# Patient Record
Sex: MALE | Race: WHITE | NOT HISPANIC OR LATINO | Employment: OTHER | URBAN - METROPOLITAN AREA
[De-identification: names, ages, dates, MRNs, and addresses within clinical notes are randomized per-mention and may not be internally consistent; named-entity substitution may affect disease eponyms.]

---

## 2016-03-20 LAB — HM AAA US VASCULAR SCREEN: NORMAL

## 2017-01-03 ENCOUNTER — MED INFO FORMS (OUTPATIENT)
Dept: ORTHOPEDICS | Age: 62
End: 2017-01-03

## 2018-02-26 ENCOUNTER — TELEPHONE (OUTPATIENT)
Dept: ORTHOPEDICS | Age: 63
End: 2018-02-26

## 2019-02-18 ENCOUNTER — HOSPITAL ENCOUNTER (EMERGENCY)
Facility: CLINIC | Age: 64
Discharge: HOME | End: 2019-02-18
Attending: EMERGENCY MEDICINE | Admitting: EMERGENCY MEDICINE
Payer: OTHER MISCELLANEOUS

## 2019-02-18 VITALS
SYSTOLIC BLOOD PRESSURE: 156 MMHG | HEART RATE: 77 BPM | TEMPERATURE: 97.9 F | BODY MASS INDEX: 31.21 KG/M2 | RESPIRATION RATE: 16 BRPM | OXYGEN SATURATION: 98 % | HEIGHT: 70 IN | DIASTOLIC BLOOD PRESSURE: 100 MMHG | WEIGHT: 218 LBS

## 2019-02-18 DIAGNOSIS — S05.8X1A SUPERFICIAL INJURY OF RIGHT CORNEA, INITIAL ENCOUNTER: ICD-10-CM

## 2019-02-18 PROCEDURE — 99282 EMERGENCY DEPT VISIT SF MDM: CPT

## 2019-02-18 RX ORDER — PROPARACAINE HYDROCHLORIDE 5 MG/ML
SOLUTION/ DROPS OPHTHALMIC
Status: DISCONTINUED
Start: 2019-02-18 | End: 2019-02-18 | Stop reason: HOSPADM

## 2019-02-18 RX ORDER — OFLOXACIN 3 MG/ML
1-2 SOLUTION/ DROPS OPHTHALMIC
Qty: 9 ML | Refills: 0 | Status: SHIPPED | OUTPATIENT
Start: 2019-02-18 | End: 2019-02-28

## 2019-02-18 ASSESSMENT — ENCOUNTER SYMPTOMS: EYE PAIN: 1

## 2019-02-18 ASSESSMENT — MIFFLIN-ST. JEOR: SCORE: 1795.09

## 2019-02-18 NOTE — ED AVS SNAPSHOT
Emergency Department  [address] [place] [id number]  Phone:  [phone]  Fax:  [phone]                                    Mil Banda   MRN: [id number]    Department:   Emergency Department   Date of Visit:  2/18/2019           After Visit Summary Signature Page    I have received my discharge instructions, and my questions have been answered. I have discussed any challenges I see with this plan with the nurse or doctor.    ..........................................................................................................................................  Patient/Patient Representative Signature      ..........................................................................................................................................  Patient Representative Print Name and Relationship to Patient    ..................................................               ................................................  Date                                   Time    ..........................................................................................................................................  Reviewed by Signature/Title    ...................................................              ..............................................  Date                                               Time          22EPIC Rev 08/18

## 2019-02-18 NOTE — ED PROVIDER NOTES
"  History     Chief Complaint:  Eye Problem     HPI   Mil Banda is a 64 year old male who presents with an eye problem. 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 Patient may have touched his hand on an unknown chemical from a barrel and rubbed his eye afterwards. Patient subsequently began feeling burning and experiencing \"fogginess\" in his right eye. This occurred about almost 3 hours ago. Patient only has some mild pain to his right eye. Patient is concerned about the fogginess in his vision, therefore prompting his visit here in the emergency department for further evaluation and treatment. The patient thinks he may have touched dampening system  [employer].     Allergies:  No known drug allergies     Medications:    The patient is currently on no regular medications.     Past Medical History:    The patient does not have any past pertinent medical history.     Past Surgical History:    History reviewed. No pertinent surgical history.     Family History:    History reviewed. No pertinent family history.      Social History:  Smoking status: Never smoker  Alcohol use: Yes   PCP: No primary care provider on file.   Presents to the ED with a friend.     Review of Systems   Eyes: Positive for pain and visual disturbance.   All other systems reviewed and are negative.        Physical Exam   Patient Vitals for the past 24 hrs:   BP Temp Temp src Pulse Resp SpO2 Height Weight   02/18/19 0347 139/86 97.9  F (36.6  C) Oral 79 20 98 % 1.778 m (5' 10\") 98.9 kg (218 lb)     Visual Acuity-Right: 20/50 Visual Acuity-Left: 20/40     Physical Exam  Vitals: reviewed by me  General: Pt seen on [facility], pleasant, cooperative, and alert to conversation  Eyes: Tracking well, clear conjunctiva BL  Right eye with 15 units of pressure on the tonopen  No clear uptake on fluorescein  PH 7.0 on initial testing  Extraocular movements intact, pupils round and reactive to light and " accommodation.  Lids and lashes unremarkable.  No discharge.  ENT: MMM, midline trachea.   Lungs:  o tachypnea, no accessory muscle use. No respiratory distress.   CV: Rate as above, regular rhythm.    MSK: no peripheral edema or joint effusion.  No evidence of trauma  Skin: No rash, normal turgor and temperature  Neuro: Clear speech and no facial droop.  Psych: Not RIS, no e/o AH/VH    Emergency Department Course   Emergency Department Course:  Past medical records, nursing notes, and vitals reviewed.  0354: I performed an exam of the patient and obtained history, as documented above.   0412: I spoke with poison [facility].  0417: I rechecked the patient and updated the patient regarding the findings.   0451: I rechecked the patient. Patient discharged home with instructions regarding supportive care, medications, and reasons to return. The importance of close follow-up was reviewed.      Impression & Plan    Medical Decision Making:  Mil Banda is a 64 year old male who presents to the emergency room with eye blurriness after rubbing a chemical in his eye on accident. He has normal pressure here, has no corneal abrasions, has a normal visual acuity for the most part apart from slight blurriness. No visual fields cut, does not sound like a stroke, no evidence of glaucoma, patient otherwise doing well. I did speak with the poison center, their formal recommendations are eye irrigation and discharge when patient's feeling improved. Will discharge with very clear return to the ED precautions, primary care follow-up and ophthalmology follow-up.     5:12 AM  Patient does endorse mild blurriness of his vision is still present, even after irrigation.  I rechecked and he still has no evidence of a formal corneal ulcer, but may have irritated his cornea with a caustic substance.  As such we will give antibiotics to prevent infection, and phone number for same day or at least appointment within 48 hours with ophthalmology.      Diagnosis:    ICD-10-CM    1. Superficial injury of right cornea, initial encounter S05.8X1A        Disposition:  discharged to home    Discharge Medications:     Medication List      There are no discharge medications for this visit.         Fernanda Rodriguez  2/18/2019    EMERGENCY DEPARTMENT  Fernanda GRAY Do, am serving as a scribe at 3:54 AM on 2/18/2019 to document services personally performed by Surinder Rao MD based on my observations and the provider's statements to me.       Surinder Rao MD  02/18/19 0513

## 2020-12-09 LAB — COLONOSCOPY STUDY: NORMAL

## 2021-04-19 VITALS
DIASTOLIC BLOOD PRESSURE: 83 MMHG | HEART RATE: 75 BPM | OXYGEN SATURATION: 96 % | SYSTOLIC BLOOD PRESSURE: 128 MMHG | RESPIRATION RATE: 16 BRPM | TEMPERATURE: 97.4 F

## 2021-04-19 PROCEDURE — 272N000047 HC ADHESIVE DERMABOND SKIN

## 2021-04-19 PROCEDURE — 99282 EMERGENCY DEPT VISIT SF MDM: CPT

## 2021-04-19 PROCEDURE — 12001 RPR S/N/AX/GEN/TRNK 2.5CM/<: CPT

## 2021-04-20 ENCOUNTER — HOSPITAL ENCOUNTER (EMERGENCY)
Facility: CLINIC | Age: 66
Discharge: HOME | End: 2021-04-20
Attending: EMERGENCY MEDICINE | Admitting: EMERGENCY MEDICINE

## 2021-04-20 DIAGNOSIS — S41.111A LACERATION OF RIGHT UPPER EXTREMITY, INITIAL ENCOUNTER: ICD-10-CM

## 2021-04-20 ASSESSMENT — ENCOUNTER SYMPTOMS: WOUND: 1

## 2021-04-20 NOTE — ED PROVIDER NOTES
History     Chief Complaint:  Laceration      HPI  Mil Banda is a 66 year old male who presents to the emergency department for evaluation of a laceration. 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 Patient applied a band-aid on the wound but his employer instructed him to present to the emergency department for further evaluation and treatment. Last tetanus 2016 on chart review.    Review of Systems   Skin: Positive for wound.   All other systems reviewed and are negative.    Allergies:  No known drug allergies    Medications:    Atorvastatin  Insulin  Viagra  Metformin  Aspirin 81 mg  Lisinopril    Past Medical History:    Concussion  Chronic back pain  HTN  Tension headache  Erectile dysfunction  DM type 2    Past Surgical History:    Hernia repair  Left TKA    Social History:  The patient presents to the emergency department alone.  The patient is employed.    Physical Exam     Patient Vitals for the past 24 hrs:   BP Temp Pulse Resp SpO2   04/19/21 2201 128/83 97.4  F (36.3  C) 75 16 96 %         Physical Exam  GENERAL: well developed, pleasant  HEAD: atraumatic  EYES: pupils reactive, extraocular muscles intact, conjunctivae normal  ENT:  mucus membranes moist  NECK:  trachea midline, normal range of motion    CVS: normal S1/S2, no murmurs, intact distal pulses    MUSCULOSKELETAL: no deformities  SKIN: 1.5 cm laceration to his right elbow, able to extend and flex without difficulties no fluid effusion or drainage to suggest bursa involvement  NEURO: GCS 15, cranial nerves intact, alert and oriented x3  PSYCH:  Mood/affect normal          Emergency Department Course   Procedures    Laceration Repair        LACERATION:  A simple clean 1 cm laceration.      LOCATION:  Right elbow      FUNCTION:  Distally sensation, circulation, motor and tendon function are intact.      ANESTHESIA:  None      PREPARATION:  Irrigation and Scrubbing with Shur  Madhavi      DEBRIDEMENT:  no debridement      CLOSURE:  Wound was closed with Dermabond        Emergency Department Course:  Reviewed:  I reviewed the patient's nursing notes, vitals, past medical records, Care Everywhere.     Assessments:  0055 I assessed the patient. Exam findings described above.    Disposition:  Discharged to home.    Impression & Plan    Medical Decision Making:  Patient presents with laceration.  He is up-to-date on his tetanus.  Wound was cleansed by myself.  Dermabond was applied as it was quite superficial.  Work notes and return to work paperwork was filled out.  Critical Care time:  none    Diagnosis:    ICD-10-CM    1. Laceration of right upper extremity, initial encounter  S41.111A        Aniceto Guzman  4/20/2021   EMERGENCY DEPARTMENT  Scribe Disclosure:  I, Aniceto Guzman, am serving as a scribe at 12:58 AM on 4/20/2021 to document services personally performed by Regan Osullivan MD based on my observations and the provider's statements to me.          Regan Osullivan MD  04/20/21 0138

## 2023-05-17 ENCOUNTER — WALK IN (OUTPATIENT)
Dept: URGENT CARE | Age: 68
End: 2023-05-17

## 2023-05-17 VITALS
RESPIRATION RATE: 18 BRPM | HEART RATE: 95 BPM | TEMPERATURE: 98.6 F | DIASTOLIC BLOOD PRESSURE: 94 MMHG | SYSTOLIC BLOOD PRESSURE: 141 MMHG | OXYGEN SATURATION: 96 %

## 2023-05-17 DIAGNOSIS — R05.1 ACUTE COUGH: Primary | ICD-10-CM

## 2023-05-17 PROCEDURE — 99213 OFFICE O/P EST LOW 20 MIN: CPT | Performed by: NURSE PRACTITIONER

## 2023-05-17 RX ORDER — HYDROCHLOROTHIAZIDE 25 MG/1
25 TABLET ORAL DAILY
COMMUNITY

## 2023-05-17 RX ORDER — LOSARTAN POTASSIUM 100 MG/1
100 TABLET ORAL DAILY
COMMUNITY

## 2023-05-17 RX ORDER — ATORVASTATIN CALCIUM 10 MG/1
20 TABLET, FILM COATED ORAL DAILY
COMMUNITY

## 2023-05-17 RX ORDER — ALBUTEROL SULFATE 90 UG/1
2 AEROSOL, METERED RESPIRATORY (INHALATION) EVERY 4 HOURS PRN
Qty: 18 G | Refills: 0 | Status: SHIPPED | OUTPATIENT
Start: 2023-05-17 | End: 2023-06-24

## 2023-05-17 RX ORDER — AZITHROMYCIN 250 MG/1
TABLET, FILM COATED ORAL
Qty: 6 TABLET | Refills: 0 | Status: SHIPPED | OUTPATIENT
Start: 2023-05-17

## 2023-05-17 RX ORDER — OMEPRAZOLE 40 MG/1
40 CAPSULE, DELAYED RELEASE ORAL DAILY
COMMUNITY

## 2023-05-26 ENCOUNTER — TELEPHONE (OUTPATIENT)
Dept: FAMILY MEDICINE | Age: 68
End: 2023-05-26

## 2024-02-21 ENCOUNTER — OFFICE VISIT (OUTPATIENT)
Dept: FAMILY MEDICINE | Age: 69
End: 2024-02-21

## 2024-02-21 ENCOUNTER — IMAGING SERVICES (OUTPATIENT)
Dept: GENERAL RADIOLOGY | Age: 69
End: 2024-02-21
Attending: FAMILY MEDICINE

## 2024-02-21 VITALS
HEIGHT: 70 IN | BODY MASS INDEX: 34.27 KG/M2 | WEIGHT: 239.4 LBS | SYSTOLIC BLOOD PRESSURE: 130 MMHG | OXYGEN SATURATION: 96 % | HEART RATE: 92 BPM | RESPIRATION RATE: 16 BRPM | DIASTOLIC BLOOD PRESSURE: 80 MMHG

## 2024-02-21 DIAGNOSIS — M25.512 ACUTE PAIN OF LEFT SHOULDER: ICD-10-CM

## 2024-02-21 DIAGNOSIS — M25.512 ACUTE PAIN OF LEFT SHOULDER: Primary | ICD-10-CM

## 2024-02-21 PROCEDURE — 73030 X-RAY EXAM OF SHOULDER: CPT | Performed by: RADIOLOGY

## 2024-02-21 RX ORDER — IBUPROFEN 800 MG/1
800 TABLET ORAL EVERY 6 HOURS PRN
COMMUNITY

## 2024-02-21 RX ORDER — AMLODIPINE BESYLATE 5 MG/1
5 TABLET ORAL DAILY
COMMUNITY
Start: 2024-02-12

## 2024-02-21 ASSESSMENT — PAIN SCALES - GENERAL: PAINLEVEL: 8

## 2024-02-27 ENCOUNTER — TELEPHONE (OUTPATIENT)
Dept: FAMILY MEDICINE | Age: 69
End: 2024-02-27

## 2024-03-08 ENCOUNTER — APPOINTMENT (OUTPATIENT)
Dept: REHABILITATION | Age: 69
End: 2024-03-08

## 2024-03-15 ENCOUNTER — APPOINTMENT (OUTPATIENT)
Dept: REHABILITATION | Age: 69
End: 2024-03-15

## 2024-03-25 ENCOUNTER — CASE MANAGEMENT (OUTPATIENT)
Dept: OTHER | Age: 69
End: 2024-03-25

## 2024-03-25 RX ORDER — METFORMIN HYDROCHLORIDE 500 MG/1
500 TABLET, EXTENDED RELEASE ORAL
COMMUNITY
Start: 2024-03-15

## 2024-03-25 RX ORDER — NIACIN 100 MG
100 TABLET ORAL
COMMUNITY

## 2024-03-27 ENCOUNTER — CASE MANAGEMENT (OUTPATIENT)
Dept: OTHER | Age: 69
End: 2024-03-27

## 2024-04-03 RX ORDER — OMEPRAZOLE 20 MG/1
20 CAPSULE, DELAYED RELEASE ORAL DAILY
Qty: 90 CAPSULE | Refills: 0 | Status: SHIPPED | OUTPATIENT
Start: 2024-04-03

## 2024-04-03 RX ORDER — ATORVASTATIN CALCIUM 20 MG/1
20 TABLET, FILM COATED ORAL DAILY
Qty: 90 TABLET | Refills: 0 | Status: SHIPPED | OUTPATIENT
Start: 2024-04-03

## 2024-04-24 RX ORDER — ATORVASTATIN CALCIUM 20 MG/1
20 TABLET, FILM COATED ORAL DAILY
Qty: 90 TABLET | Refills: 3 | Status: SHIPPED | OUTPATIENT
Start: 2024-04-24

## 2024-05-09 ENCOUNTER — APPOINTMENT (OUTPATIENT)
Dept: FAMILY MEDICINE | Age: 69
End: 2024-05-09

## 2024-05-09 ENCOUNTER — LAB SERVICES (OUTPATIENT)
Dept: LAB | Age: 69
End: 2024-05-09

## 2024-05-09 VITALS
HEART RATE: 88 BPM | TEMPERATURE: 97.9 F | WEIGHT: 244.5 LBS | RESPIRATION RATE: 20 BRPM | BODY MASS INDEX: 33.12 KG/M2 | HEIGHT: 72 IN | OXYGEN SATURATION: 96 % | SYSTOLIC BLOOD PRESSURE: 156 MMHG | DIASTOLIC BLOOD PRESSURE: 90 MMHG

## 2024-05-09 DIAGNOSIS — I15.2 HYPERTENSION ASSOCIATED WITH TYPE 2 DIABETES MELLITUS  (CMD): ICD-10-CM

## 2024-05-09 DIAGNOSIS — E11.9 TYPE 2 DIABETES MELLITUS WITHOUT COMPLICATION, WITHOUT LONG-TERM CURRENT USE OF INSULIN  (CMD): ICD-10-CM

## 2024-05-09 DIAGNOSIS — G89.29 CHRONIC LEFT HIP PAIN: ICD-10-CM

## 2024-05-09 DIAGNOSIS — M25.552 CHRONIC LEFT HIP PAIN: ICD-10-CM

## 2024-05-09 DIAGNOSIS — K22.70 BARRETT'S ESOPHAGUS WITHOUT DYSPLASIA: ICD-10-CM

## 2024-05-09 DIAGNOSIS — E11.59 HYPERTENSION ASSOCIATED WITH TYPE 2 DIABETES MELLITUS  (CMD): ICD-10-CM

## 2024-05-09 DIAGNOSIS — Z76.89 ENCOUNTER TO ESTABLISH CARE: ICD-10-CM

## 2024-05-09 DIAGNOSIS — B35.3 TINEA PEDIS OF LEFT FOOT: ICD-10-CM

## 2024-05-09 DIAGNOSIS — M62.830 SPASM OF BACK MUSCLES: ICD-10-CM

## 2024-05-09 DIAGNOSIS — G89.29 CHRONIC LEFT SHOULDER PAIN: Primary | ICD-10-CM

## 2024-05-09 DIAGNOSIS — Z12.11 COLON CANCER SCREENING: ICD-10-CM

## 2024-05-09 DIAGNOSIS — M25.512 CHRONIC LEFT SHOULDER PAIN: Primary | ICD-10-CM

## 2024-05-09 PROBLEM — Z96.659 FAILURE OF TOTAL KNEE REPLACEMENT (CMD): Status: RESOLVED | Noted: 2024-05-09 | Resolved: 2024-05-09

## 2024-05-09 PROBLEM — T84.018A FAILURE OF TOTAL KNEE REPLACEMENT (CMD): Status: RESOLVED | Noted: 2024-05-09 | Resolved: 2024-05-09

## 2024-05-09 PROBLEM — M87.9 OSTEONECROSIS OF LEFT HIP  (CMD): Status: RESOLVED | Noted: 2022-10-07 | Resolved: 2024-05-09

## 2024-05-09 PROBLEM — T84.018A FAILURE OF TOTAL KNEE REPLACEMENT (CMD): Status: ACTIVE | Noted: 2024-05-09

## 2024-05-09 PROBLEM — I10 HTN (HYPERTENSION): Status: ACTIVE | Noted: 2020-02-05

## 2024-05-09 PROBLEM — M87.9 OSTEONECROSIS OF LEFT HIP  (CMD): Status: ACTIVE | Noted: 2022-10-07

## 2024-05-09 PROBLEM — E11.65 TYPE 2 DIABETES MELLITUS WITH HYPERGLYCEMIA, WITHOUT LONG-TERM CURRENT USE OF INSULIN  (CMD): Status: ACTIVE | Noted: 2020-03-02

## 2024-05-09 PROBLEM — I70.90 ATHEROSCLEROSIS: Status: ACTIVE | Noted: 2020-03-02

## 2024-05-09 PROBLEM — Z96.659 FAILURE OF TOTAL KNEE REPLACEMENT (CMD): Status: ACTIVE | Noted: 2024-05-09

## 2024-05-09 PROCEDURE — 82570 ASSAY OF URINE CREATININE: CPT | Performed by: CLINICAL MEDICAL LABORATORY

## 2024-05-09 PROCEDURE — 80053 COMPREHEN METABOLIC PANEL: CPT | Performed by: CLINICAL MEDICAL LABORATORY

## 2024-05-09 PROCEDURE — 20610 DRAIN/INJ JOINT/BURSA W/O US: CPT | Performed by: STUDENT IN AN ORGANIZED HEALTH CARE EDUCATION/TRAINING PROGRAM

## 2024-05-09 PROCEDURE — 85025 COMPLETE CBC W/AUTO DIFF WBC: CPT | Performed by: CLINICAL MEDICAL LABORATORY

## 2024-05-09 PROCEDURE — 82043 UR ALBUMIN QUANTITATIVE: CPT | Performed by: CLINICAL MEDICAL LABORATORY

## 2024-05-09 PROCEDURE — 36415 COLL VENOUS BLD VENIPUNCTURE: CPT | Performed by: INTERNAL MEDICINE

## 2024-05-09 PROCEDURE — 83036 HEMOGLOBIN GLYCOSYLATED A1C: CPT | Performed by: CLINICAL MEDICAL LABORATORY

## 2024-05-09 PROCEDURE — 99215 OFFICE O/P EST HI 40 MIN: CPT | Performed by: STUDENT IN AN ORGANIZED HEALTH CARE EDUCATION/TRAINING PROGRAM

## 2024-05-09 RX ORDER — OMEPRAZOLE 20 MG/1
20 CAPSULE, DELAYED RELEASE ORAL DAILY
Qty: 90 CAPSULE | Refills: 2 | Status: SHIPPED | OUTPATIENT
Start: 2024-05-09

## 2024-05-09 RX ORDER — KETOCONAZOLE 20 MG/G
CREAM TOPICAL
Qty: 30 G | Refills: 0 | Status: SHIPPED | OUTPATIENT
Start: 2024-05-09

## 2024-05-09 RX ORDER — ATORVASTATIN CALCIUM 20 MG/1
20 TABLET, FILM COATED ORAL DAILY
Qty: 90 TABLET | Refills: 2 | Status: SHIPPED | OUTPATIENT
Start: 2024-05-09

## 2024-05-09 RX ORDER — LOSARTAN POTASSIUM 100 MG/1
100 TABLET ORAL DAILY
Qty: 90 TABLET | Refills: 2 | Status: SHIPPED | OUTPATIENT
Start: 2024-05-09

## 2024-05-09 RX ORDER — BACLOFEN 5 MG/1
5 TABLET ORAL 3 TIMES DAILY PRN
Qty: 30 TABLET | Refills: 0 | Status: SHIPPED | OUTPATIENT
Start: 2024-05-09

## 2024-05-09 RX ORDER — HYDROCHLOROTHIAZIDE 25 MG/1
25 TABLET ORAL DAILY
Qty: 90 TABLET | Refills: 2 | Status: SHIPPED | OUTPATIENT
Start: 2024-05-09

## 2024-05-09 RX ORDER — MELOXICAM 15 MG/1
15 TABLET ORAL DAILY PRN
Qty: 30 TABLET | Refills: 1 | Status: SHIPPED | OUTPATIENT
Start: 2024-05-09

## 2024-05-09 RX ORDER — METFORMIN HYDROCHLORIDE 500 MG/1
500 TABLET, EXTENDED RELEASE ORAL
Qty: 90 TABLET | Refills: 2 | Status: SHIPPED | OUTPATIENT
Start: 2024-05-09

## 2024-05-09 RX ORDER — AMLODIPINE BESYLATE 5 MG/1
5 TABLET ORAL DAILY
Qty: 90 TABLET | Refills: 2 | Status: SHIPPED | OUTPATIENT
Start: 2024-05-09

## 2024-05-09 RX ORDER — TRIAMCINOLONE ACETONIDE 40 MG/ML
40 INJECTION, SUSPENSION INTRA-ARTICULAR; INTRAMUSCULAR ONCE
Status: COMPLETED | OUTPATIENT
Start: 2024-05-09 | End: 2024-05-09

## 2024-05-09 RX ADMIN — TRIAMCINOLONE ACETONIDE 40 MG: 40 INJECTION, SUSPENSION INTRA-ARTICULAR; INTRAMUSCULAR at 11:55

## 2024-05-10 ENCOUNTER — TELEPHONE (OUTPATIENT)
Dept: ORTHOPEDICS | Age: 69
End: 2024-05-10

## 2024-05-10 ENCOUNTER — TELEPHONE (OUTPATIENT)
Dept: FAMILY MEDICINE | Age: 69
End: 2024-05-10

## 2024-05-10 ENCOUNTER — IMAGING SERVICES (OUTPATIENT)
Dept: GENERAL RADIOLOGY | Age: 69
End: 2024-05-10
Attending: STUDENT IN AN ORGANIZED HEALTH CARE EDUCATION/TRAINING PROGRAM

## 2024-05-10 DIAGNOSIS — E11.9 TYPE 2 DIABETES MELLITUS WITHOUT COMPLICATION, WITHOUT LONG-TERM CURRENT USE OF INSULIN  (CMD): Primary | ICD-10-CM

## 2024-05-10 DIAGNOSIS — G89.29 CHRONIC LEFT HIP PAIN: Primary | ICD-10-CM

## 2024-05-10 DIAGNOSIS — G89.29 CHRONIC LEFT HIP PAIN: ICD-10-CM

## 2024-05-10 DIAGNOSIS — M25.552 CHRONIC LEFT HIP PAIN: ICD-10-CM

## 2024-05-10 DIAGNOSIS — M25.552 CHRONIC LEFT HIP PAIN: Primary | ICD-10-CM

## 2024-05-10 LAB
ALBUMIN SERPL-MCNC: 4.3 G/DL (ref 3.6–5.1)
ALBUMIN/GLOB SERPL: 1.2 {RATIO} (ref 1–2.4)
ALP SERPL-CCNC: 108 UNITS/L (ref 45–117)
ALT SERPL-CCNC: 95 UNITS/L
ANION GAP SERPL CALC-SCNC: 11 MMOL/L (ref 7–19)
AST SERPL-CCNC: 48 UNITS/L
BASOPHILS # BLD: 0.1 K/MCL (ref 0–0.3)
BASOPHILS NFR BLD: 1 %
BILIRUB SERPL-MCNC: 0.6 MG/DL (ref 0.2–1)
BUN SERPL-MCNC: 25 MG/DL (ref 6–20)
BUN/CREAT SERPL: 26 (ref 7–25)
CALCIUM SERPL-MCNC: 9.7 MG/DL (ref 8.4–10.2)
CHLORIDE SERPL-SCNC: 103 MMOL/L (ref 97–110)
CO2 SERPL-SCNC: 26 MMOL/L (ref 21–32)
CREAT SERPL-MCNC: 0.97 MG/DL (ref 0.67–1.17)
CREAT UR-MCNC: 86.1 MG/DL
DEPRECATED RDW RBC: 40.2 FL (ref 39–50)
EGFRCR SERPLBLD CKD-EPI 2021: 86 ML/MIN/{1.73_M2}
EOSINOPHIL # BLD: 0.2 K/MCL (ref 0–0.5)
EOSINOPHIL NFR BLD: 2 %
ERYTHROCYTE [DISTWIDTH] IN BLOOD: 12.1 % (ref 11–15)
FASTING DURATION TIME PATIENT: 12 HOURS (ref 0–999)
GLOBULIN SER-MCNC: 3.5 G/DL (ref 2–4)
GLUCOSE SERPL-MCNC: 365 MG/DL (ref 70–99)
HBA1C MFR BLD: 11.5 % (ref 4.5–5.6)
HCT VFR BLD CALC: 47 % (ref 39–51)
HGB BLD-MCNC: 15.3 G/DL (ref 13–17)
IMM GRANULOCYTES # BLD AUTO: 0.1 K/MCL (ref 0–0.2)
IMM GRANULOCYTES # BLD: 1 %
LYMPHOCYTES # BLD: 2 K/MCL (ref 1–4)
LYMPHOCYTES NFR BLD: 27 %
MCH RBC QN AUTO: 29.5 PG (ref 26–34)
MCHC RBC AUTO-ENTMCNC: 32.6 G/DL (ref 32–36.5)
MCV RBC AUTO: 90.7 FL (ref 78–100)
MICROALBUMIN UR-MCNC: 3.91 MG/DL
MICROALBUMIN/CREAT UR: 45.4 MG/G
MONOCYTES # BLD: 0.8 K/MCL (ref 0.3–0.9)
MONOCYTES NFR BLD: 11 %
NEUTROPHILS # BLD: 4.2 K/MCL (ref 1.8–7.7)
NEUTROPHILS NFR BLD: 58 %
NRBC BLD MANUAL-RTO: 0 /100 WBC
PLATELET # BLD AUTO: 211 K/MCL (ref 140–450)
POTASSIUM SERPL-SCNC: 4.2 MMOL/L (ref 3.4–5.1)
PROT SERPL-MCNC: 7.8 G/DL (ref 6.4–8.2)
RBC # BLD: 5.18 MIL/MCL (ref 4.5–5.9)
SODIUM SERPL-SCNC: 136 MMOL/L (ref 135–145)
WBC # BLD: 7.3 K/MCL (ref 4.2–11)

## 2024-05-10 PROCEDURE — 73502 X-RAY EXAM HIP UNI 2-3 VIEWS: CPT | Performed by: RADIOLOGY

## 2024-05-16 DIAGNOSIS — M62.830 SPASM OF BACK MUSCLES: ICD-10-CM

## 2024-05-16 RX ORDER — BACLOFEN 5 MG/1
5 TABLET ORAL 3 TIMES DAILY PRN
Qty: 30 TABLET | Refills: 0 | OUTPATIENT
Start: 2024-05-16

## 2024-05-22 ENCOUNTER — TELEPHONE (OUTPATIENT)
Dept: FAMILY MEDICINE | Age: 69
End: 2024-05-22

## 2024-05-23 ENCOUNTER — APPOINTMENT (OUTPATIENT)
Dept: FAMILY MEDICINE | Age: 69
End: 2024-05-23

## 2024-05-31 ENCOUNTER — TELEPHONE (OUTPATIENT)
Dept: FAMILY MEDICINE | Age: 69
End: 2024-05-31

## 2024-05-31 ENCOUNTER — APPOINTMENT (OUTPATIENT)
Dept: FAMILY MEDICINE | Age: 69
End: 2024-05-31

## 2024-05-31 VITALS — HEART RATE: 88 BPM | DIASTOLIC BLOOD PRESSURE: 80 MMHG | SYSTOLIC BLOOD PRESSURE: 108 MMHG

## 2024-05-31 DIAGNOSIS — B35.3 TINEA PEDIS OF LEFT FOOT: ICD-10-CM

## 2024-05-31 DIAGNOSIS — I15.2 HYPERTENSION ASSOCIATED WITH TYPE 2 DIABETES MELLITUS  (CMD): Primary | ICD-10-CM

## 2024-05-31 DIAGNOSIS — E11.59 HYPERTENSION ASSOCIATED WITH TYPE 2 DIABETES MELLITUS  (CMD): Primary | ICD-10-CM

## 2024-05-31 RX ORDER — KETOCONAZOLE 20 MG/G
CREAM TOPICAL
Qty: 30 G | Refills: 0 | Status: SHIPPED | OUTPATIENT
Start: 2024-05-31

## 2024-06-04 ENCOUNTER — APPOINTMENT (OUTPATIENT)
Dept: ORTHOPEDICS | Age: 69
End: 2024-06-04
Attending: STUDENT IN AN ORGANIZED HEALTH CARE EDUCATION/TRAINING PROGRAM

## 2024-06-04 DIAGNOSIS — M16.12 PRIMARY OSTEOARTHRITIS OF LEFT HIP: ICD-10-CM

## 2024-06-04 DIAGNOSIS — G89.29 CHRONIC LEFT HIP PAIN: Primary | ICD-10-CM

## 2024-06-04 DIAGNOSIS — M25.552 CHRONIC LEFT HIP PAIN: Primary | ICD-10-CM

## 2024-06-07 ENCOUNTER — HOSPITAL ENCOUNTER (OUTPATIENT)
Age: 69
Discharge: HOME | End: 2024-06-07
Attending: ANESTHESIOLOGY | Admitting: ANESTHESIOLOGY

## 2024-06-07 VITALS
SYSTOLIC BLOOD PRESSURE: 153 MMHG | DIASTOLIC BLOOD PRESSURE: 97 MMHG | HEART RATE: 93 BPM | RESPIRATION RATE: 18 BRPM | OXYGEN SATURATION: 97 % | TEMPERATURE: 97 F

## 2024-06-07 DIAGNOSIS — M16.12 PRIMARY OSTEOARTHRITIS OF LEFT HIP: Primary | ICD-10-CM

## 2024-06-07 PROBLEM — M16.9 HIP OSTEOARTHRITIS: Status: ACTIVE | Noted: 2024-06-07

## 2024-06-07 PROCEDURE — 77002 NEEDLE LOCALIZATION BY XRAY: CPT | Performed by: ANESTHESIOLOGY

## 2024-06-07 PROCEDURE — 20610 DRAIN/INJ JOINT/BURSA W/O US: CPT | Performed by: ANESTHESIOLOGY

## 2024-06-07 PROCEDURE — 10004560 HB COUNTER-EXTENDED RECOVERY PER HOUR

## 2024-06-07 PROCEDURE — 10002800 HB RX 250 W HCPCS: Performed by: ANESTHESIOLOGY

## 2024-06-07 PROCEDURE — 10004316 HB COUNTER-PREP

## 2024-06-07 PROCEDURE — 10002801 HB RX 250 W/O HCPCS: Performed by: ANESTHESIOLOGY

## 2024-06-07 RX ORDER — METHYLPREDNISOLONE ACETATE 80 MG/ML
INJECTION, SUSPENSION INTRA-ARTICULAR; INTRALESIONAL; INTRAMUSCULAR; SOFT TISSUE PRN
Status: DISCONTINUED | OUTPATIENT
Start: 2024-06-07 | End: 2024-06-07 | Stop reason: HOSPADM

## 2024-06-07 RX ORDER — LIDOCAINE HYDROCHLORIDE 10 MG/ML
INJECTION, SOLUTION EPIDURAL; INFILTRATION; INTRACAUDAL; PERINEURAL PRN
Status: DISCONTINUED | OUTPATIENT
Start: 2024-06-07 | End: 2024-06-07 | Stop reason: HOSPADM

## 2024-06-07 RX ORDER — BUPIVACAINE HYDROCHLORIDE 2.5 MG/ML
INJECTION, SOLUTION EPIDURAL; INFILTRATION; INTRACAUDAL PRN
Status: DISCONTINUED | OUTPATIENT
Start: 2024-06-07 | End: 2024-06-07 | Stop reason: HOSPADM

## 2024-06-07 SDOH — SOCIAL STABILITY: SOCIAL INSECURITY: HOW OFTEN DOES ANYONE, INCLUDING FAMILY AND FRIENDS, PHYSICALLY HURT YOU?: NEVER

## 2024-06-07 SDOH — SOCIAL STABILITY: SOCIAL INSECURITY: HOW OFTEN DOES ANYONE, INCLUDING FAMILY AND FRIENDS, THREATEN YOU WITH HARM?: NEVER

## 2024-06-07 SDOH — SOCIAL STABILITY: SOCIAL INSECURITY: HOW OFTEN DOES ANYONE, INCLUDING FAMILY AND FRIENDS, SCREAM OR CURSE AT YOU?: NEVER

## 2024-06-07 SDOH — SOCIAL STABILITY: SOCIAL INSECURITY: HOW OFTEN DOES ANYONE, INCLUDING FAMILY AND FRIENDS, INSULT OR TALK DOWN TO YOU?: NEVER

## 2024-06-07 ASSESSMENT — ACTIVITIES OF DAILY LIVING (ADL)
ADL_BEFORE_ADMISSION: INDEPENDENT
CHRONIC_PAIN_PRESENT: YES, CHRONIC
NEEDS_ASSIST: NO
ADL_SCORE: 12
ADL_SHORT_OF_BREATH: NO
HISTORY OF FALLING IN THE LAST YEAR (PRIOR TO ADMISSION): NO
RECENT_DECLINE_ADL: NO
SENSORY_SUPPORT_DEVICES: EYEGLASSES

## 2024-06-07 ASSESSMENT — PAIN SCALES - GENERAL
PAINLEVEL_OUTOF10: 5
PAINLEVEL_OUTOF10: 3

## 2024-06-24 ENCOUNTER — TELEPHONE (OUTPATIENT)
Dept: FAMILY MEDICINE | Age: 69
End: 2024-06-24

## 2024-07-09 ENCOUNTER — TELEPHONE (OUTPATIENT)
Dept: ORTHOPEDICS | Age: 69
End: 2024-07-09

## 2024-07-18 ENCOUNTER — APPOINTMENT (OUTPATIENT)
Dept: ORTHOPEDICS | Age: 69
End: 2024-07-18

## 2024-07-18 DIAGNOSIS — M16.12 PRIMARY OSTEOARTHRITIS OF LEFT HIP: Primary | ICD-10-CM

## 2024-07-22 ENCOUNTER — TELEPHONE (OUTPATIENT)
Dept: FAMILY MEDICINE | Age: 69
End: 2024-07-22

## 2024-07-22 DIAGNOSIS — E11.9 TYPE 2 DIABETES MELLITUS WITHOUT COMPLICATION, WITHOUT LONG-TERM CURRENT USE OF INSULIN  (CMD): ICD-10-CM

## 2024-07-22 RX ORDER — METFORMIN HYDROCHLORIDE 500 MG/1
1000 TABLET, EXTENDED RELEASE ORAL 2 TIMES DAILY WITH MEALS
Qty: 360 TABLET | Refills: 3 | Status: SHIPPED | OUTPATIENT
Start: 2024-07-22

## 2024-07-25 DIAGNOSIS — M25.552 CHRONIC LEFT HIP PAIN: ICD-10-CM

## 2024-07-25 DIAGNOSIS — M25.512 CHRONIC LEFT SHOULDER PAIN: ICD-10-CM

## 2024-07-25 DIAGNOSIS — G89.29 CHRONIC LEFT HIP PAIN: ICD-10-CM

## 2024-07-25 DIAGNOSIS — G89.29 CHRONIC LEFT SHOULDER PAIN: ICD-10-CM

## 2024-07-25 RX ORDER — MELOXICAM 15 MG/1
TABLET ORAL
Qty: 30 TABLET | Refills: 0 | Status: SHIPPED | OUTPATIENT
Start: 2024-07-25

## 2024-08-01 ENCOUNTER — APPOINTMENT (OUTPATIENT)
Dept: LAB | Age: 69
End: 2024-08-01

## 2024-08-01 DIAGNOSIS — M25.512 CHRONIC LEFT SHOULDER PAIN: ICD-10-CM

## 2024-08-01 DIAGNOSIS — E11.9 TYPE 2 DIABETES MELLITUS WITHOUT COMPLICATION, WITHOUT LONG-TERM CURRENT USE OF INSULIN  (CMD): ICD-10-CM

## 2024-08-01 DIAGNOSIS — M25.552 CHRONIC LEFT HIP PAIN: ICD-10-CM

## 2024-08-01 DIAGNOSIS — G89.29 CHRONIC LEFT SHOULDER PAIN: ICD-10-CM

## 2024-08-01 DIAGNOSIS — G89.29 CHRONIC LEFT HIP PAIN: ICD-10-CM

## 2024-08-01 LAB
ALBUMIN SERPL-MCNC: 4 G/DL (ref 3.6–5.1)
ALBUMIN/GLOB SERPL: 1.2 {RATIO} (ref 1–2.4)
ALP SERPL-CCNC: 94 UNITS/L (ref 45–117)
ALT SERPL-CCNC: 58 UNITS/L
ANION GAP SERPL CALC-SCNC: 9 MMOL/L (ref 7–19)
AST SERPL-CCNC: 24 UNITS/L
BILIRUB SERPL-MCNC: 0.5 MG/DL (ref 0.2–1)
BUN SERPL-MCNC: 20 MG/DL (ref 6–20)
BUN/CREAT SERPL: 20 (ref 7–25)
CALCIUM SERPL-MCNC: 9.8 MG/DL (ref 8.4–10.2)
CHLORIDE SERPL-SCNC: 103 MMOL/L (ref 97–110)
CO2 SERPL-SCNC: 28 MMOL/L (ref 21–32)
CREAT SERPL-MCNC: 1.02 MG/DL (ref 0.67–1.17)
EGFRCR SERPLBLD CKD-EPI 2021: 81 ML/MIN/{1.73_M2}
FASTING DURATION TIME PATIENT: ABNORMAL H
GLOBULIN SER-MCNC: 3.3 G/DL (ref 2–4)
GLUCOSE SERPL-MCNC: 220 MG/DL (ref 70–99)
HBA1C MFR BLD: 11 % (ref 4.5–5.6)
POTASSIUM SERPL-SCNC: 4.1 MMOL/L (ref 3.4–5.1)
PROT SERPL-MCNC: 7.3 G/DL (ref 6.4–8.2)
SODIUM SERPL-SCNC: 136 MMOL/L (ref 135–145)

## 2024-08-01 PROCEDURE — 36415 COLL VENOUS BLD VENIPUNCTURE: CPT | Performed by: INTERNAL MEDICINE

## 2024-08-01 PROCEDURE — 80053 COMPREHEN METABOLIC PANEL: CPT | Performed by: CLINICAL MEDICAL LABORATORY

## 2024-08-01 PROCEDURE — 83036 HEMOGLOBIN GLYCOSYLATED A1C: CPT | Performed by: CLINICAL MEDICAL LABORATORY

## 2024-08-01 RX ORDER — MELOXICAM 15 MG/1
TABLET ORAL
Qty: 30 TABLET | Refills: 0 | OUTPATIENT
Start: 2024-08-01

## 2024-08-02 ENCOUNTER — TELEPHONE (OUTPATIENT)
Dept: INTERNAL MEDICINE | Age: 69
End: 2024-08-02

## 2024-08-02 RX ORDER — GLIPIZIDE 10 MG/1
TABLET, FILM COATED, EXTENDED RELEASE ORAL DAILY
Qty: 150 TABLET | Refills: 0 | Status: SHIPPED | OUTPATIENT
Start: 2024-08-02 | End: 2024-10-01

## 2024-08-05 RX ORDER — GLIPIZIDE 10 MG/1
TABLET, FILM COATED, EXTENDED RELEASE ORAL
Qty: 150 TABLET | Refills: 0 | OUTPATIENT
Start: 2024-08-05

## 2024-08-12 ENCOUNTER — APPOINTMENT (OUTPATIENT)
Dept: LAB | Age: 69
End: 2024-08-12

## 2024-08-14 ENCOUNTER — TELEPHONE (OUTPATIENT)
Dept: ORTHOPEDICS | Age: 69
End: 2024-08-14

## 2024-08-26 ENCOUNTER — TELEPHONE (OUTPATIENT)
Dept: SURGERY | Age: 69
End: 2024-08-26

## 2024-08-26 DIAGNOSIS — Z12.11 COLON CANCER SCREENING: Primary | ICD-10-CM

## 2024-09-04 DIAGNOSIS — M25.512 CHRONIC LEFT SHOULDER PAIN: ICD-10-CM

## 2024-09-04 DIAGNOSIS — G89.29 CHRONIC LEFT HIP PAIN: ICD-10-CM

## 2024-09-04 DIAGNOSIS — G89.29 CHRONIC LEFT SHOULDER PAIN: ICD-10-CM

## 2024-09-04 DIAGNOSIS — M25.552 CHRONIC LEFT HIP PAIN: ICD-10-CM

## 2024-09-04 RX ORDER — MELOXICAM 15 MG/1
15 TABLET ORAL DAILY PRN
Qty: 30 TABLET | Refills: 0 | Status: SHIPPED | OUTPATIENT
Start: 2024-09-04

## 2024-09-16 ENCOUNTER — TELEPHONE (OUTPATIENT)
Dept: FAMILY MEDICINE | Age: 69
End: 2024-09-16

## 2024-09-16 RX ORDER — SEMAGLUTIDE 0.68 MG/ML
INJECTION, SOLUTION SUBCUTANEOUS
Qty: 3 ML | Refills: 0 | Status: SHIPPED | OUTPATIENT
Start: 2024-09-16 | End: 2024-11-11

## 2024-09-17 ENCOUNTER — APPOINTMENT (OUTPATIENT)
Dept: OPHTHALMOLOGY | Age: 69
End: 2024-09-17
Attending: STUDENT IN AN ORGANIZED HEALTH CARE EDUCATION/TRAINING PROGRAM

## 2024-10-01 ENCOUNTER — TELEPHONE (OUTPATIENT)
Dept: SURGERY | Age: 69
End: 2024-10-01

## 2024-10-03 DIAGNOSIS — M25.512 CHRONIC LEFT SHOULDER PAIN: ICD-10-CM

## 2024-10-03 DIAGNOSIS — G89.29 CHRONIC LEFT HIP PAIN: ICD-10-CM

## 2024-10-03 DIAGNOSIS — M25.552 CHRONIC LEFT HIP PAIN: ICD-10-CM

## 2024-10-03 DIAGNOSIS — G89.29 CHRONIC LEFT SHOULDER PAIN: ICD-10-CM

## 2024-10-03 RX ORDER — MELOXICAM 15 MG/1
15 TABLET ORAL DAILY PRN
Qty: 30 TABLET | Refills: 0 | Status: SHIPPED | OUTPATIENT
Start: 2024-10-03

## 2024-10-21 ENCOUNTER — LAB SERVICES (OUTPATIENT)
Dept: LAB | Age: 69
End: 2024-10-21

## 2024-10-21 ENCOUNTER — APPOINTMENT (OUTPATIENT)
Dept: INTERNAL MEDICINE | Age: 69
End: 2024-10-21

## 2024-10-21 VITALS
HEART RATE: 73 BPM | WEIGHT: 222 LBS | OXYGEN SATURATION: 98 % | DIASTOLIC BLOOD PRESSURE: 80 MMHG | BODY MASS INDEX: 31.08 KG/M2 | SYSTOLIC BLOOD PRESSURE: 136 MMHG | HEIGHT: 71 IN

## 2024-10-21 DIAGNOSIS — M16.12 PRIMARY OSTEOARTHRITIS OF LEFT HIP: ICD-10-CM

## 2024-10-21 DIAGNOSIS — I15.2 HYPERTENSION ASSOCIATED WITH TYPE 2 DIABETES MELLITUS  (CMD): ICD-10-CM

## 2024-10-21 DIAGNOSIS — E11.9 TYPE 2 DIABETES MELLITUS WITHOUT COMPLICATION, WITHOUT LONG-TERM CURRENT USE OF INSULIN  (CMD): ICD-10-CM

## 2024-10-21 DIAGNOSIS — E11.59 HYPERTENSION ASSOCIATED WITH TYPE 2 DIABETES MELLITUS  (CMD): ICD-10-CM

## 2024-10-21 DIAGNOSIS — Z12.5 PROSTATE CANCER SCREENING: ICD-10-CM

## 2024-10-21 DIAGNOSIS — K22.70 BARRETT'S ESOPHAGUS WITHOUT DYSPLASIA: ICD-10-CM

## 2024-10-21 DIAGNOSIS — Z12.11 COLON CANCER SCREENING: ICD-10-CM

## 2024-10-21 DIAGNOSIS — Z76.89 ENCOUNTER TO ESTABLISH CARE: Primary | ICD-10-CM

## 2024-10-21 PROCEDURE — 99204 OFFICE O/P NEW MOD 45 MIN: CPT

## 2024-10-21 PROCEDURE — G0103 PSA SCREENING: HCPCS | Performed by: CLINICAL MEDICAL LABORATORY

## 2024-10-21 PROCEDURE — 80061 LIPID PANEL: CPT | Performed by: CLINICAL MEDICAL LABORATORY

## 2024-10-21 PROCEDURE — 36415 COLL VENOUS BLD VENIPUNCTURE: CPT | Performed by: INTERNAL MEDICINE

## 2024-10-21 PROCEDURE — 80053 COMPREHEN METABOLIC PANEL: CPT | Performed by: CLINICAL MEDICAL LABORATORY

## 2024-10-21 PROCEDURE — 85025 COMPLETE CBC W/AUTO DIFF WBC: CPT | Performed by: CLINICAL MEDICAL LABORATORY

## 2024-10-21 RX ORDER — SEMAGLUTIDE 0.68 MG/ML
0.5 INJECTION, SOLUTION SUBCUTANEOUS
Qty: 3 ML | Refills: 0 | Status: SHIPPED | OUTPATIENT
Start: 2024-10-27 | End: 2024-11-24

## 2024-10-21 ASSESSMENT — PATIENT HEALTH QUESTIONNAIRE - PHQ9
5. POOR APPETITE, WEIGHT LOSS, OR OVEREATING: SEVERAL DAYS
3. TROUBLE FALLING OR STAYING ASLEEP OR SLEEPING TOO MUCH: NOT AT ALL
CLINICAL INTERPRETATION OF PHQ9 SCORE: MINIMAL DEPRESSION
2. FEELING DOWN, DEPRESSED OR HOPELESS: NOT AT ALL
7. TROUBLE CONCENTRATING ON THINGS, SUCH AS READING THE NEWSPAPER OR WATCHING TELEVISION: NOT AT ALL
SUM OF ALL RESPONSES TO PHQ9 QUESTIONS 1 AND 2: 0
SUM OF ALL RESPONSES TO PHQ9 QUESTIONS 1 AND 2: 0
4. FEELING TIRED OR HAVING LITTLE ENERGY: NOT AT ALL
8. MOVING OR SPEAKING SO SLOWLY THAT OTHER PEOPLE COULD HAVE NOTICED. OR THE OPPOSITE, BEING SO FIGETY OR RESTLESS THAT YOU HAVE BEEN MOVING AROUND A LOT MORE THAN USUAL: NOT AT ALL
SUM OF ALL RESPONSES TO PHQ QUESTIONS 1-9: 1
1. LITTLE INTEREST OR PLEASURE IN DOING THINGS: NOT AT ALL
CLINICAL INTERPRETATION OF PHQ2 SCORE: NO FURTHER SCREENING NEEDED
10. IF YOU CHECKED OFF ANY PROBLEMS, HOW DIFFICULT HAVE THESE PROBLEMS MADE IT FOR YOU TO DO YOUR WORK, TAKE CARE OF THINGS AT HOME, OR GET ALONG WITH OTHER PEOPLE: NOT DIFFICULT AT ALL
9. THOUGHTS THAT YOU WOULD BE BETTER OFF DEAD, OR OF HURTING YOURSELF: NOT AT ALL
6. FEELING BAD ABOUT YOURSELF - OR THAT YOU ARE A FAILURE OR HAVE LET YOURSELF OR YOUR FAMILY DOWN: NOT AT ALL

## 2024-10-22 ENCOUNTER — TELEPHONE (OUTPATIENT)
Dept: INTERNAL MEDICINE | Age: 69
End: 2024-10-22

## 2024-10-22 LAB
ALBUMIN SERPL-MCNC: 4.3 G/DL (ref 3.4–5)
ALBUMIN/GLOB SERPL: 1.3 {RATIO} (ref 1–2.4)
ALP SERPL-CCNC: 87 UNITS/L (ref 45–117)
ALT SERPL-CCNC: 45 UNITS/L
ANION GAP SERPL CALC-SCNC: 13 MMOL/L (ref 7–19)
AST SERPL-CCNC: 19 UNITS/L
BASOPHILS # BLD: 0.1 K/MCL (ref 0–0.3)
BASOPHILS NFR BLD: 1 %
BILIRUB SERPL-MCNC: 0.4 MG/DL (ref 0.2–1)
BUN SERPL-MCNC: 17 MG/DL (ref 6–20)
BUN/CREAT SERPL: 18 (ref 7–25)
CALCIUM SERPL-MCNC: 10 MG/DL (ref 8.4–10.2)
CHLORIDE SERPL-SCNC: 105 MMOL/L (ref 97–110)
CHOLEST SERPL-MCNC: 142 MG/DL
CHOLEST/HDLC SERPL: 3.4 {RATIO}
CO2 SERPL-SCNC: 25 MMOL/L (ref 21–32)
CREAT SERPL-MCNC: 0.97 MG/DL (ref 0.67–1.17)
DEPRECATED RDW RBC: 42 FL (ref 39–50)
EGFRCR SERPLBLD CKD-EPI 2021: 86 ML/MIN/{1.73_M2}
EOSINOPHIL # BLD: 0.2 K/MCL (ref 0–0.5)
EOSINOPHIL NFR BLD: 3 %
ERYTHROCYTE [DISTWIDTH] IN BLOOD: 12.7 % (ref 11–15)
FASTING DURATION TIME PATIENT: 4 HOURS (ref 0–999)
GLOBULIN SER-MCNC: 3.4 G/DL (ref 2–4)
GLUCOSE SERPL-MCNC: 101 MG/DL (ref 70–99)
HCT VFR BLD CALC: 45.9 % (ref 39–51)
HDLC SERPL-MCNC: 42 MG/DL
HGB BLD-MCNC: 14.6 G/DL (ref 13–17)
IMM GRANULOCYTES # BLD AUTO: 0 K/MCL (ref 0–0.2)
IMM GRANULOCYTES # BLD: 0 %
LDLC SERPL CALC-MCNC: 81 MG/DL
LYMPHOCYTES # BLD: 2.1 K/MCL (ref 1–4)
LYMPHOCYTES NFR BLD: 24 %
MCH RBC QN AUTO: 29.1 PG (ref 26–34)
MCHC RBC AUTO-ENTMCNC: 31.8 G/DL (ref 32–36.5)
MCV RBC AUTO: 91.4 FL (ref 78–100)
MONOCYTES # BLD: 0.8 K/MCL (ref 0.3–0.9)
MONOCYTES NFR BLD: 9 %
NEUTROPHILS # BLD: 5.5 K/MCL (ref 1.8–7.7)
NEUTROPHILS NFR BLD: 63 %
NONHDLC SERPL-MCNC: 100 MG/DL
NRBC BLD MANUAL-RTO: 0 /100 WBC
PLATELET # BLD AUTO: 256 K/MCL (ref 140–450)
POTASSIUM SERPL-SCNC: 4.6 MMOL/L (ref 3.4–5.1)
PROT SERPL-MCNC: 7.7 G/DL (ref 6.4–8.2)
PSA SERPL-MCNC: 1.01 NG/ML
RBC # BLD: 5.02 MIL/MCL (ref 4.5–5.9)
SODIUM SERPL-SCNC: 138 MMOL/L (ref 135–145)
TRIGL SERPL-MCNC: 96 MG/DL
WBC # BLD: 8.7 K/MCL (ref 4.2–11)

## 2024-10-28 ENCOUNTER — APPOINTMENT (OUTPATIENT)
Dept: EDUCATION SERVICES | Age: 69
End: 2024-10-28

## 2024-10-28 DIAGNOSIS — E11.9 TYPE 2 DIABETES MELLITUS WITHOUT COMPLICATION, WITHOUT LONG-TERM CURRENT USE OF INSULIN  (CMD): Primary | ICD-10-CM

## 2024-10-28 PROCEDURE — G0108 DIAB MANAGE TRN  PER INDIV: HCPCS

## 2024-10-29 ENCOUNTER — TELEPHONE (OUTPATIENT)
Dept: INTERNAL MEDICINE | Age: 69
End: 2024-10-29

## 2024-10-29 DIAGNOSIS — E11.9 TYPE 2 DIABETES MELLITUS WITHOUT COMPLICATION, WITHOUT LONG-TERM CURRENT USE OF INSULIN  (CMD): ICD-10-CM

## 2024-10-29 RX ORDER — SEMAGLUTIDE 0.68 MG/ML
1 INJECTION, SOLUTION SUBCUTANEOUS
Qty: 3 ML | Refills: 1 | Status: SHIPPED | OUTPATIENT
Start: 2024-11-10 | End: 2024-12-08

## 2024-11-05 RX ORDER — ZOSTER VACCINE RECOMBINANT, ADJUVANTED 50 MCG/0.5
0.5 KIT INTRAMUSCULAR
Qty: 1 EACH | Refills: 1 | Status: CANCELLED | OUTPATIENT
Start: 2024-11-05

## 2024-11-07 DIAGNOSIS — E11.59 HYPERTENSION ASSOCIATED WITH TYPE 2 DIABETES MELLITUS  (CMD): ICD-10-CM

## 2024-11-07 DIAGNOSIS — I15.2 HYPERTENSION ASSOCIATED WITH TYPE 2 DIABETES MELLITUS  (CMD): ICD-10-CM

## 2024-11-07 RX ORDER — HYDROCHLOROTHIAZIDE 25 MG/1
25 TABLET ORAL DAILY
Qty: 90 TABLET | Refills: 2 | OUTPATIENT
Start: 2024-11-07

## 2024-11-07 RX ORDER — LOSARTAN POTASSIUM 100 MG/1
100 TABLET ORAL DAILY
Qty: 90 TABLET | Refills: 2 | OUTPATIENT
Start: 2024-11-07

## 2024-11-10 DIAGNOSIS — G89.29 CHRONIC LEFT SHOULDER PAIN: ICD-10-CM

## 2024-11-10 DIAGNOSIS — G89.29 CHRONIC LEFT HIP PAIN: ICD-10-CM

## 2024-11-10 DIAGNOSIS — M25.512 CHRONIC LEFT SHOULDER PAIN: ICD-10-CM

## 2024-11-10 DIAGNOSIS — M25.552 CHRONIC LEFT HIP PAIN: ICD-10-CM

## 2024-11-11 ENCOUNTER — APPOINTMENT (OUTPATIENT)
Dept: LAB | Age: 69
End: 2024-11-11

## 2024-11-11 RX ORDER — MELOXICAM 15 MG/1
15 TABLET ORAL DAILY PRN
Qty: 30 TABLET | Refills: 0 | OUTPATIENT
Start: 2024-11-11

## 2024-11-12 ENCOUNTER — TELEPHONE (OUTPATIENT)
Dept: INTERNAL MEDICINE | Age: 69
End: 2024-11-12

## 2024-11-12 DIAGNOSIS — E11.9 TYPE 2 DIABETES MELLITUS WITHOUT COMPLICATION, WITHOUT LONG-TERM CURRENT USE OF INSULIN  (CMD): ICD-10-CM

## 2024-11-12 RX ORDER — SEMAGLUTIDE 1.34 MG/ML
1 INJECTION, SOLUTION SUBCUTANEOUS
Qty: 3 ML | Refills: 1 | Status: SHIPPED | OUTPATIENT
Start: 2024-11-12

## 2024-11-15 ENCOUNTER — APPOINTMENT (OUTPATIENT)
Dept: FAMILY MEDICINE | Age: 69
End: 2024-11-15

## 2024-11-19 ENCOUNTER — APPOINTMENT (OUTPATIENT)
Dept: INTERNAL MEDICINE | Age: 69
End: 2024-11-19

## 2024-11-19 ENCOUNTER — LAB SERVICES (OUTPATIENT)
Dept: LAB | Age: 69
End: 2024-11-19

## 2024-11-19 VITALS
SYSTOLIC BLOOD PRESSURE: 126 MMHG | OXYGEN SATURATION: 97 % | TEMPERATURE: 97.7 F | WEIGHT: 213 LBS | DIASTOLIC BLOOD PRESSURE: 82 MMHG | HEIGHT: 72 IN | BODY MASS INDEX: 28.85 KG/M2 | HEART RATE: 94 BPM

## 2024-11-19 DIAGNOSIS — E11.59 HYPERTENSION ASSOCIATED WITH TYPE 2 DIABETES MELLITUS  (CMD): ICD-10-CM

## 2024-11-19 DIAGNOSIS — I15.2 HYPERTENSION ASSOCIATED WITH TYPE 2 DIABETES MELLITUS  (CMD): ICD-10-CM

## 2024-11-19 DIAGNOSIS — E11.9 TYPE 2 DIABETES MELLITUS WITHOUT COMPLICATION, WITHOUT LONG-TERM CURRENT USE OF INSULIN  (CMD): ICD-10-CM

## 2024-11-19 DIAGNOSIS — Z00.00 MEDICARE ANNUAL WELLNESS VISIT, INITIAL: Primary | ICD-10-CM

## 2024-11-19 DIAGNOSIS — M62.830 SPASM OF BACK MUSCLES: ICD-10-CM

## 2024-11-19 DIAGNOSIS — K21.9 GASTROESOPHAGEAL REFLUX DISEASE, UNSPECIFIED WHETHER ESOPHAGITIS PRESENT: ICD-10-CM

## 2024-11-19 DIAGNOSIS — Z12.5 PROSTATE CANCER SCREENING: ICD-10-CM

## 2024-11-19 DIAGNOSIS — E78.5 DYSLIPIDEMIA: ICD-10-CM

## 2024-11-19 DIAGNOSIS — K22.70 BARRETT'S ESOPHAGUS WITHOUT DYSPLASIA: ICD-10-CM

## 2024-11-19 PROCEDURE — 36415 COLL VENOUS BLD VENIPUNCTURE: CPT | Performed by: INTERNAL MEDICINE

## 2024-11-19 PROCEDURE — 82043 UR ALBUMIN QUANTITATIVE: CPT | Performed by: CLINICAL MEDICAL LABORATORY

## 2024-11-19 PROCEDURE — 83036 HEMOGLOBIN GLYCOSYLATED A1C: CPT | Performed by: CLINICAL MEDICAL LABORATORY

## 2024-11-19 PROCEDURE — 82570 ASSAY OF URINE CREATININE: CPT | Performed by: CLINICAL MEDICAL LABORATORY

## 2024-11-19 PROCEDURE — 80048 BASIC METABOLIC PNL TOTAL CA: CPT | Performed by: CLINICAL MEDICAL LABORATORY

## 2024-11-19 RX ORDER — GLIPIZIDE 10 MG/1
TABLET, FILM COATED, EXTENDED RELEASE ORAL
Status: SHIPPED | COMMUNITY
Start: 2024-11-19

## 2024-11-19 RX ORDER — BACLOFEN 5 MG/1
5 TABLET ORAL 3 TIMES DAILY PRN
Qty: 30 TABLET | Refills: 0 | Status: SHIPPED | OUTPATIENT
Start: 2024-11-19

## 2024-11-19 ASSESSMENT — PATIENT HEALTH QUESTIONNAIRE - PHQ9
SUM OF ALL RESPONSES TO PHQ9 QUESTIONS 1 AND 2: 0
2. FEELING DOWN, DEPRESSED OR HOPELESS: NOT AT ALL
1. LITTLE INTEREST OR PLEASURE IN DOING THINGS: NOT AT ALL
CLINICAL INTERPRETATION OF PHQ2 SCORE: NO FURTHER SCREENING NEEDED
SUM OF ALL RESPONSES TO PHQ9 QUESTIONS 1 AND 2: 0

## 2024-11-19 ASSESSMENT — PAIN SCALES - GENERAL: PAINLEVEL: 5

## 2024-11-20 ENCOUNTER — TELEPHONE (OUTPATIENT)
Dept: INTERNAL MEDICINE | Age: 69
End: 2024-11-20

## 2024-11-20 DIAGNOSIS — M16.12 PRIMARY OSTEOARTHRITIS OF LEFT HIP: Primary | ICD-10-CM

## 2024-11-20 LAB
ANION GAP SERPL CALC-SCNC: 9 MMOL/L (ref 7–19)
BUN SERPL-MCNC: 20 MG/DL (ref 6–20)
BUN/CREAT SERPL: 19 (ref 7–25)
CALCIUM SERPL-MCNC: 10.2 MG/DL (ref 8.4–10.2)
CHLORIDE SERPL-SCNC: 105 MMOL/L (ref 97–110)
CO2 SERPL-SCNC: 27 MMOL/L (ref 21–32)
CREAT SERPL-MCNC: 1.06 MG/DL (ref 0.67–1.17)
CREAT UR-MCNC: 296 MG/DL
EGFRCR SERPLBLD CKD-EPI 2021: 77 ML/MIN/{1.73_M2}
FASTING DURATION TIME PATIENT: 12 HOURS (ref 0–999)
GLUCOSE SERPL-MCNC: 87 MG/DL (ref 70–99)
HBA1C MFR BLD: 5.8 % (ref 4.5–5.6)
MICROALBUMIN UR-MCNC: 3.72 MG/DL
MICROALBUMIN/CREAT UR: 12.6 MG/G
POTASSIUM SERPL-SCNC: 4.1 MMOL/L (ref 3.4–5.1)
SODIUM SERPL-SCNC: 137 MMOL/L (ref 135–145)

## 2024-11-27 DIAGNOSIS — M62.830 SPASM OF BACK MUSCLES: ICD-10-CM

## 2024-11-27 RX ORDER — BACLOFEN 5 MG/1
5 TABLET ORAL 3 TIMES DAILY PRN
Qty: 30 TABLET | Refills: 0 | OUTPATIENT
Start: 2024-11-27

## 2024-12-10 ENCOUNTER — APPOINTMENT (OUTPATIENT)
Dept: EDUCATION SERVICES | Age: 69
End: 2024-12-10

## 2024-12-12 DIAGNOSIS — E11.9 TYPE 2 DIABETES MELLITUS WITHOUT COMPLICATION, WITHOUT LONG-TERM CURRENT USE OF INSULIN  (CMD): ICD-10-CM

## 2024-12-12 RX ORDER — SEMAGLUTIDE 1.34 MG/ML
INJECTION, SOLUTION SUBCUTANEOUS
Qty: 3 ML | Refills: 1 | Status: SHIPPED | OUTPATIENT
Start: 2024-12-12

## 2024-12-13 ENCOUNTER — APPOINTMENT (OUTPATIENT)
Dept: GASTROENTEROLOGY | Age: 69
End: 2024-12-13

## 2024-12-15 ENCOUNTER — HOSPITAL ENCOUNTER (OUTPATIENT)
Age: 69
End: 2024-12-15
Attending: ORTHOPAEDIC SURGERY | Admitting: ORTHOPAEDIC SURGERY

## 2024-12-23 ENCOUNTER — CLINICAL ABSTRACT (OUTPATIENT)
Dept: HEALTH INFORMATION MANAGEMENT | Facility: OTHER | Age: 69
End: 2024-12-23

## 2025-01-02 ENCOUNTER — APPOINTMENT (OUTPATIENT)
Dept: INTERNAL MEDICINE | Age: 70
End: 2025-01-02

## 2025-01-02 ENCOUNTER — LAB SERVICES (OUTPATIENT)
Dept: LAB | Age: 70
End: 2025-01-02

## 2025-01-02 VITALS
WEIGHT: 220 LBS | HEART RATE: 83 BPM | SYSTOLIC BLOOD PRESSURE: 130 MMHG | OXYGEN SATURATION: 98 % | DIASTOLIC BLOOD PRESSURE: 78 MMHG | HEIGHT: 71 IN | BODY MASS INDEX: 30.8 KG/M2

## 2025-01-02 DIAGNOSIS — Z01.818 PREOP EXAMINATION: Primary | ICD-10-CM

## 2025-01-02 DIAGNOSIS — Z01.818 PREOP EXAMINATION: ICD-10-CM

## 2025-01-02 DIAGNOSIS — E11.9 TYPE 2 DIABETES MELLITUS WITHOUT COMPLICATION, WITHOUT LONG-TERM CURRENT USE OF INSULIN  (CMD): ICD-10-CM

## 2025-01-02 LAB
BASOPHILS # BLD: 0.1 K/MCL (ref 0–0.3)
BASOPHILS NFR BLD: 1 %
DEPRECATED RDW RBC: 40.4 FL (ref 39–50)
EOSINOPHIL # BLD: 0.2 K/MCL (ref 0–0.5)
EOSINOPHIL NFR BLD: 2 %
ERYTHROCYTE [DISTWIDTH] IN BLOOD: 12.5 % (ref 11–15)
HCT VFR BLD CALC: 43.4 % (ref 39–51)
HGB BLD-MCNC: 14.2 G/DL (ref 13–17)
IMM GRANULOCYTES # BLD AUTO: 0 K/MCL (ref 0–0.2)
IMM GRANULOCYTES # BLD: 0 %
LYMPHOCYTES # BLD: 3 K/MCL (ref 1–4)
LYMPHOCYTES NFR BLD: 27 %
MCH RBC QN AUTO: 28.9 PG (ref 26–34)
MCHC RBC AUTO-ENTMCNC: 32.7 G/DL (ref 32–36.5)
MCV RBC AUTO: 88.2 FL (ref 78–100)
MONOCYTES # BLD: 1.1 K/MCL (ref 0.3–0.9)
MONOCYTES NFR BLD: 10 %
NEUTROPHILS # BLD: 6.6 K/MCL (ref 1.8–7.7)
NEUTROPHILS NFR BLD: 60 %
NRBC BLD MANUAL-RTO: 0 /100 WBC
PLATELET # BLD AUTO: 278 K/MCL (ref 140–450)
RBC # BLD: 4.92 MIL/MCL (ref 4.5–5.9)
WBC # BLD: 10.9 K/MCL (ref 4.2–11)

## 2025-01-02 PROCEDURE — 36415 COLL VENOUS BLD VENIPUNCTURE: CPT | Performed by: INTERNAL MEDICINE

## 2025-01-02 PROCEDURE — 85025 COMPLETE CBC W/AUTO DIFF WBC: CPT | Performed by: CLINICAL MEDICAL LABORATORY

## 2025-01-02 PROCEDURE — 80048 BASIC METABOLIC PNL TOTAL CA: CPT | Performed by: CLINICAL MEDICAL LABORATORY

## 2025-01-02 RX ORDER — SEMAGLUTIDE 1.34 MG/ML
1 INJECTION, SOLUTION SUBCUTANEOUS
Qty: 3 ML | Refills: 1 | Status: SHIPPED | OUTPATIENT
Start: 2025-01-02

## 2025-01-03 ENCOUNTER — TELEPHONE (OUTPATIENT)
Dept: INTERNAL MEDICINE | Age: 70
End: 2025-01-03

## 2025-01-03 LAB
ANION GAP SERPL CALC-SCNC: 8 MMOL/L (ref 7–19)
BUN SERPL-MCNC: 17 MG/DL (ref 6–20)
BUN/CREAT SERPL: 16 (ref 7–25)
CALCIUM SERPL-MCNC: 9.9 MG/DL (ref 8.4–10.2)
CHLORIDE SERPL-SCNC: 104 MMOL/L (ref 97–110)
CO2 SERPL-SCNC: 28 MMOL/L (ref 21–32)
CREAT SERPL-MCNC: 1.05 MG/DL (ref 0.67–1.17)
EGFRCR SERPLBLD CKD-EPI 2021: 78 ML/MIN/{1.73_M2}
FASTING DURATION TIME PATIENT: 0 HOURS (ref 0–999)
GLUCOSE SERPL-MCNC: 72 MG/DL (ref 70–99)
POTASSIUM SERPL-SCNC: 3.9 MMOL/L (ref 3.4–5.1)
SODIUM SERPL-SCNC: 136 MMOL/L (ref 135–145)

## 2025-01-06 ENCOUNTER — E-ADVICE (OUTPATIENT)
Dept: OTHER | Age: 70
End: 2025-01-06

## 2025-01-06 RX ORDER — ACETAMINOPHEN 325 MG/1
650 TABLET ORAL EVERY 4 HOURS PRN
COMMUNITY

## 2025-01-06 ASSESSMENT — HOOS JR
RISING FROM SITTING: MODERATE
WALKING ON UNEVEN SURFACE: SEVERE
BENDING TO THE FLOOR TO PICK UP OBJECT: SEVERE
LYING IN BED (TURNING OVER, MAINTAINING HIP POSITION): SEVERE
GOING UP OR DOWN STAIRS: MODERATE
HOOS JR SCORING: 39.9
SITTING: SEVERE

## 2025-01-06 ASSESSMENT — PROMIS GLOBAL HEALTH SCALE
RATE_PHYSICAL_HEALTH: VERY GOOD
RATE_GENERAL_HEALTH: GOOD
SOCIAL_ACTIVITIES_ROLES_SCORE: 3
GENERAL_HEALTH_SCORE: 3
CARRYOUT_PHYSICAL_ACTIVITIES: MOSTLY
CARRYOUT_SOCIAL_ACTIVITIES: GOOD
RATE_AVERAGE_PAIN: 5
MENTAL_HEALTH_TSCORE: INCOMPLETE
RATE_AVERAGE_FATIGUE: MODERATE
PHYSICAL_HEALTH_TSCORE: INCOMPLETE

## 2025-01-06 ASSESSMENT — ACTIVITIES OF DAILY LIVING (ADL)
HISTORY OF FALLING IN THE LAST YEAR (PRIOR TO ADMISSION): NO
ADL_BEFORE_ADMISSION: INDEPENDENT
RECENT_DECLINE_ADL: NO
DESCRIBE HOW PAIN IMPACTS YOUR LIFE: RELAXATION/REST/SLEEP;MOBILITY;PERSONAL CARE/HOUSEHOLD CHORES;PHYSICAL ACTIVITY
ADL_SHORT_OF_BREATH: NO
ADL_SCORE: 12
CHRONIC_PAIN_PRESENT: YES, CHRONIC

## 2025-01-07 ENCOUNTER — HOSPITAL ENCOUNTER (OUTPATIENT)
Dept: REHABILITATION | Age: 70
Discharge: STILL ADMITTED OR ADMINISTRATIVE | End: 2025-01-07
Attending: ORTHOPAEDIC SURGERY

## 2025-01-07 PROCEDURE — 10004173 HB COUNTER-THERAPY VISIT PT: Performed by: PHYSICAL THERAPIST

## 2025-01-07 PROCEDURE — 97161 PT EVAL LOW COMPLEX 20 MIN: CPT | Performed by: PHYSICAL THERAPIST

## 2025-01-07 PROCEDURE — 97530 THERAPEUTIC ACTIVITIES: CPT | Performed by: PHYSICAL THERAPIST

## 2025-01-07 ASSESSMENT — ENCOUNTER SYMPTOMS
PAIN SEVERITY NOW: 5
QUALITY: SHOOTING
PAIN FREQUENCY: CONSTANT
QUALITY: BURNING
ALLEVIATING FACTORS: CHANGE IN POSITION
PAIN LOCATION: LEFT HIP
PAIN SCALE AT HIGHEST: 6
SUBJECTIVE PAIN PROGRESSION: WORSENING
PAIN SCALE AT LOWEST: 2

## 2025-01-09 PROBLEM — M16.12 OSTEOARTHRITIS OF LEFT HIP: Status: ACTIVE | Noted: 2025-01-09

## 2025-01-10 ENCOUNTER — HOSPITAL ENCOUNTER (OUTPATIENT)
Age: 70
Discharge: HOME | End: 2025-01-10
Attending: ORTHOPAEDIC SURGERY | Admitting: ORTHOPAEDIC SURGERY

## 2025-01-10 ENCOUNTER — APPOINTMENT (OUTPATIENT)
Dept: GENERAL RADIOLOGY | Age: 70
End: 2025-01-10

## 2025-01-10 ENCOUNTER — APPOINTMENT (OUTPATIENT)
Dept: GENERAL RADIOLOGY | Age: 70
End: 2025-01-10
Attending: ORTHOPAEDIC SURGERY

## 2025-01-10 ENCOUNTER — ANESTHESIA EVENT (OUTPATIENT)
Dept: SURGERY | Age: 70
End: 2025-01-10

## 2025-01-10 ENCOUNTER — ANESTHESIA (OUTPATIENT)
Dept: SURGERY | Age: 70
End: 2025-01-10

## 2025-01-10 VITALS
RESPIRATION RATE: 16 BRPM | DIASTOLIC BLOOD PRESSURE: 65 MMHG | BODY MASS INDEX: 29.68 KG/M2 | TEMPERATURE: 97.8 F | SYSTOLIC BLOOD PRESSURE: 119 MMHG | OXYGEN SATURATION: 97 % | HEIGHT: 71 IN | WEIGHT: 212 LBS | HEART RATE: 82 BPM

## 2025-01-10 DIAGNOSIS — G89.18 POST-OP PAIN: ICD-10-CM

## 2025-01-10 DIAGNOSIS — M16.12 PRIMARY OSTEOARTHRITIS OF LEFT HIP: Primary | ICD-10-CM

## 2025-01-10 LAB
GLUCOSE BLDC GLUCOMTR-MCNC: 118 MG/DL (ref 70–99)
GLUCOSE BLDC GLUCOMTR-MCNC: 138 MG/DL (ref 70–99)

## 2025-01-10 PROCEDURE — 10006023 HB SUPPLY 272: Performed by: ORTHOPAEDIC SURGERY

## 2025-01-10 PROCEDURE — 10002801 HB RX 250 W/O HCPCS: Performed by: NURSE ANESTHETIST, CERTIFIED REGISTERED

## 2025-01-10 PROCEDURE — 10002800 HB RX 250 W HCPCS: Performed by: ORTHOPAEDIC SURGERY

## 2025-01-10 PROCEDURE — 10002807 HB RX 258: Performed by: NURSE ANESTHETIST, CERTIFIED REGISTERED

## 2025-01-10 PROCEDURE — 10002800 HB RX 250 W HCPCS

## 2025-01-10 PROCEDURE — 10006027 HB SUPPLY 278: Performed by: ORTHOPAEDIC SURGERY

## 2025-01-10 PROCEDURE — 10004452 HB PACU ADDL 30 MINUTES: Performed by: ORTHOPAEDIC SURGERY

## 2025-01-10 PROCEDURE — 82962 GLUCOSE BLOOD TEST: CPT

## 2025-01-10 PROCEDURE — 73502 X-RAY EXAM HIP UNI 2-3 VIEWS: CPT

## 2025-01-10 PROCEDURE — 10002800 HB RX 250 W HCPCS: Performed by: ANESTHESIOLOGY

## 2025-01-10 PROCEDURE — 10004172 HB COUNTER-THERAPY VISIT OT: Performed by: OCCUPATIONAL THERAPIST

## 2025-01-10 PROCEDURE — C1776 JOINT DEVICE (IMPLANTABLE): HCPCS | Performed by: ORTHOPAEDIC SURGERY

## 2025-01-10 PROCEDURE — 97161 PT EVAL LOW COMPLEX 20 MIN: CPT | Performed by: PHYSICAL THERAPIST

## 2025-01-10 PROCEDURE — 10006403 HB ORTHO MAJOR COMPLEX: Performed by: ORTHOPAEDIC SURGERY

## 2025-01-10 PROCEDURE — 10004173 HB COUNTER-THERAPY VISIT PT: Performed by: PHYSICAL THERAPIST

## 2025-01-10 PROCEDURE — 10002801 HB RX 250 W/O HCPCS: Performed by: ORTHOPAEDIC SURGERY

## 2025-01-10 PROCEDURE — 10002117 HB ADDITIONAL SURGERY TIME/30 MIN: Performed by: ORTHOPAEDIC SURGERY

## 2025-01-10 PROCEDURE — 10002800 HB RX 250 W HCPCS: Performed by: NURSE ANESTHETIST, CERTIFIED REGISTERED

## 2025-01-10 PROCEDURE — 10002801 HB RX 250 W/O HCPCS

## 2025-01-10 PROCEDURE — 10002807 HB RX 258: Performed by: ORTHOPAEDIC SURGERY

## 2025-01-10 PROCEDURE — 10005281 FL INTRAOPERATIVE C ARM WITH REPORT

## 2025-01-10 PROCEDURE — C1713 ANCHOR/SCREW BN/BN,TIS/BN: HCPCS | Performed by: ORTHOPAEDIC SURGERY

## 2025-01-10 PROCEDURE — 10002803 HB RX 637

## 2025-01-10 PROCEDURE — 10002767 HB EXTENDED RECOVERY PER HOUR: Performed by: ORTHOPAEDIC SURGERY

## 2025-01-10 PROCEDURE — 73502 X-RAY EXAM HIP UNI 2-3 VIEWS: CPT | Performed by: RADIOLOGY

## 2025-01-10 PROCEDURE — A6212 FOAM DRG <=16 SQ IN W/BORDER: HCPCS | Performed by: ORTHOPAEDIC SURGERY

## 2025-01-10 PROCEDURE — 97165 OT EVAL LOW COMPLEX 30 MIN: CPT | Performed by: OCCUPATIONAL THERAPIST

## 2025-01-10 PROCEDURE — 10004451 HB PACU RECOVERY 1ST 30 MINUTES: Performed by: ORTHOPAEDIC SURGERY

## 2025-01-10 PROCEDURE — 10002358 HB ANESTH GENERAL 1ST 1/2 HR: Performed by: ORTHOPAEDIC SURGERY

## 2025-01-10 PROCEDURE — 10002359 HB ANESTH GENERAL ADD'L 1/2 HR: Performed by: ORTHOPAEDIC SURGERY

## 2025-01-10 PROCEDURE — 10004651 HB RX, NO CHARGE ITEM

## 2025-01-10 DEVICE — PINNACLE CANCELLOUS BONE SCREW 6.5MM X 30MM
Type: IMPLANTABLE DEVICE | Site: HIP | Status: FUNCTIONAL
Brand: PINNACLE

## 2025-01-10 DEVICE — APEX HOLE ELIMINATOR - PS
Type: IMPLANTABLE DEVICE | Site: HIP | Status: FUNCTIONAL
Brand: APEX

## 2025-01-10 DEVICE — PINNACLE GRIPTION ACETABULAR SHELL SECTOR 56MM OD
Type: IMPLANTABLE DEVICE | Site: HIP | Status: FUNCTIONAL
Brand: PINNACLE GRIPTION

## 2025-01-10 DEVICE — PINNACLE CANCELLOUS BONE SCREW 6.5MM X 15MM
Type: IMPLANTABLE DEVICE | Site: HIP | Status: FUNCTIONAL
Brand: PINNACLE

## 2025-01-10 DEVICE — BIOLOX DELTA TS CERAMIC FEMORAL HEAD 12/14 TAPER REVISION DIAMETER 40MM +5
Type: IMPLANTABLE DEVICE | Site: HIP | Status: FUNCTIONAL
Brand: BIOLOX DELTA

## 2025-01-10 DEVICE — PINNACLE HIP SOLUTIONS ALTRX LD POLYETHYLENE ACETABULAR LINER +4 NEUTRAL 40MM ID 56MM OD
Type: IMPLANTABLE DEVICE | Site: HIP | Status: FUNCTIONAL
Brand: PINNACLE ALTRX

## 2025-01-10 DEVICE — ACTIS DUOFIX HIP PROSTHESIS (FEMORAL STEM 12/14 TAPER CEMENTLESS SIZE 7 STD COLLAR)  CE
Type: IMPLANTABLE DEVICE | Site: HIP | Status: FUNCTIONAL
Brand: ACTIS

## 2025-01-10 RX ORDER — 0.9 % SODIUM CHLORIDE 0.9 %
2 VIAL (ML) INJECTION EVERY 12 HOURS SCHEDULED
Status: DISCONTINUED | OUTPATIENT
Start: 2025-01-10 | End: 2025-01-10 | Stop reason: HOSPADM

## 2025-01-10 RX ORDER — DEXAMETHASONE SODIUM PHOSPHATE 4 MG/ML
INJECTION, SOLUTION INTRA-ARTICULAR; INTRALESIONAL; INTRAMUSCULAR; INTRAVENOUS; SOFT TISSUE PRN
Status: DISCONTINUED | OUTPATIENT
Start: 2025-01-10 | End: 2025-01-10

## 2025-01-10 RX ORDER — ACETAMINOPHEN 650 MG/1
650 SUPPOSITORY RECTAL EVERY 4 HOURS PRN
Status: DISCONTINUED | OUTPATIENT
Start: 2025-01-10 | End: 2025-01-10 | Stop reason: HOSPADM

## 2025-01-10 RX ORDER — OXYCODONE HYDROCHLORIDE 5 MG/1
10 TABLET ORAL EVERY 4 HOURS PRN
Status: DISCONTINUED | OUTPATIENT
Start: 2025-01-10 | End: 2025-01-10 | Stop reason: HOSPADM

## 2025-01-10 RX ORDER — AMOXICILLIN 250 MG
1 CAPSULE ORAL DAILY
Qty: 30 TABLET | Refills: 1 | Status: SHIPPED | OUTPATIENT
Start: 2025-01-10

## 2025-01-10 RX ORDER — DIPHENHYDRAMINE HCL 25 MG
25 CAPSULE ORAL EVERY 4 HOURS PRN
Status: DISCONTINUED | OUTPATIENT
Start: 2025-01-10 | End: 2025-01-10 | Stop reason: HOSPADM

## 2025-01-10 RX ORDER — ONDANSETRON 2 MG/ML
4 INJECTION INTRAMUSCULAR; INTRAVENOUS 2 TIMES DAILY PRN
Status: DISCONTINUED | OUTPATIENT
Start: 2025-01-10 | End: 2025-01-10 | Stop reason: HOSPADM

## 2025-01-10 RX ORDER — CYCLOBENZAPRINE HCL 5 MG
5 TABLET ORAL 3 TIMES DAILY PRN
Status: DISCONTINUED | OUTPATIENT
Start: 2025-01-10 | End: 2025-01-10 | Stop reason: HOSPADM

## 2025-01-10 RX ORDER — CELECOXIB 100 MG/1
200 CAPSULE ORAL
Status: COMPLETED | OUTPATIENT
Start: 2025-01-10 | End: 2025-01-10

## 2025-01-10 RX ORDER — ASPIRIN 325 MG
325 TABLET ORAL DAILY
Qty: 30 TABLET | Refills: 0 | Status: SHIPPED | OUTPATIENT
Start: 2025-01-10 | End: 2025-02-09

## 2025-01-10 RX ORDER — AMOXICILLIN 250 MG
2 CAPSULE ORAL 2 TIMES DAILY PRN
Status: DISCONTINUED | OUTPATIENT
Start: 2025-01-10 | End: 2025-01-10 | Stop reason: HOSPADM

## 2025-01-10 RX ORDER — HYDRALAZINE HYDROCHLORIDE 20 MG/ML
5 INJECTION INTRAMUSCULAR; INTRAVENOUS EVERY 10 MIN PRN
Status: DISCONTINUED | OUTPATIENT
Start: 2025-01-10 | End: 2025-01-10 | Stop reason: HOSPADM

## 2025-01-10 RX ORDER — CEPHALEXIN 500 MG/1
1000 CAPSULE ORAL ONCE
Qty: 2 CAPSULE | Refills: 0 | Status: SHIPPED | OUTPATIENT
Start: 2025-01-11 | End: 2025-01-11

## 2025-01-10 RX ORDER — CYCLOBENZAPRINE HCL 10 MG
10 TABLET ORAL 3 TIMES DAILY PRN
Qty: 30 TABLET | Refills: 1 | Status: SHIPPED | OUTPATIENT
Start: 2025-01-10

## 2025-01-10 RX ORDER — DIPHENHYDRAMINE HYDROCHLORIDE 50 MG/ML
25 INJECTION INTRAMUSCULAR; INTRAVENOUS EVERY 4 HOURS PRN
Status: DISCONTINUED | OUTPATIENT
Start: 2025-01-10 | End: 2025-01-10 | Stop reason: HOSPADM

## 2025-01-10 RX ORDER — DEXMEDETOMIDINE HYDROCHLORIDE 4 UG/ML
INJECTION, SOLUTION INTRAVENOUS PRN
Status: DISCONTINUED | OUTPATIENT
Start: 2025-01-10 | End: 2025-01-10

## 2025-01-10 RX ORDER — BISACODYL 10 MG
10 SUPPOSITORY, RECTAL RECTAL DAILY PRN
Status: DISCONTINUED | OUTPATIENT
Start: 2025-01-10 | End: 2025-01-10 | Stop reason: HOSPADM

## 2025-01-10 RX ORDER — ACETAMINOPHEN 500 MG
1000 TABLET ORAL EVERY 8 HOURS PRN
Status: DISCONTINUED | OUTPATIENT
Start: 2025-01-10 | End: 2025-01-10 | Stop reason: HOSPADM

## 2025-01-10 RX ORDER — KETAMINE HCL IN NACL, ISO-OSM 100MG/10ML
SYRINGE (ML) INJECTION PRN
Status: DISCONTINUED | OUTPATIENT
Start: 2025-01-10 | End: 2025-01-10

## 2025-01-10 RX ORDER — IBUPROFEN 200 MG
600 TABLET ORAL EVERY 6 HOURS PRN
Status: DISCONTINUED | OUTPATIENT
Start: 2025-01-10 | End: 2025-01-10 | Stop reason: HOSPADM

## 2025-01-10 RX ORDER — ACETAMINOPHEN 325 MG/1
650 TABLET ORAL EVERY 4 HOURS PRN
Status: DISCONTINUED | OUTPATIENT
Start: 2025-01-10 | End: 2025-01-10 | Stop reason: HOSPADM

## 2025-01-10 RX ORDER — CEFAZOLIN SODIUM 1 G/3ML
INJECTION, POWDER, FOR SOLUTION INTRAMUSCULAR; INTRAVENOUS PRN
Status: DISCONTINUED | OUTPATIENT
Start: 2025-01-10 | End: 2025-01-10 | Stop reason: HOSPADM

## 2025-01-10 RX ORDER — OXYCODONE HYDROCHLORIDE 5 MG/1
5 TABLET ORAL EVERY 4 HOURS PRN
Qty: 36 TABLET | Refills: 0 | Status: SHIPPED | OUTPATIENT
Start: 2025-01-10

## 2025-01-10 RX ORDER — ROCURONIUM BROMIDE 10 MG/ML
INJECTION, SOLUTION INTRAVENOUS PRN
Status: DISCONTINUED | OUTPATIENT
Start: 2025-01-10 | End: 2025-01-10

## 2025-01-10 RX ORDER — ACETAMINOPHEN 500 MG
1000 TABLET ORAL
Status: COMPLETED | OUTPATIENT
Start: 2025-01-10 | End: 2025-01-10

## 2025-01-10 RX ORDER — ASPIRIN 81 MG/1
81 TABLET, CHEWABLE ORAL AT BEDTIME
Qty: 30 TABLET | Refills: 0 | Status: SHIPPED | OUTPATIENT
Start: 2025-01-11

## 2025-01-10 RX ORDER — NICOTINE POLACRILEX 4 MG
30 LOZENGE BUCCAL
Status: DISCONTINUED | OUTPATIENT
Start: 2025-01-10 | End: 2025-01-10 | Stop reason: HOSPADM

## 2025-01-10 RX ORDER — 0.9 % SODIUM CHLORIDE 0.9 %
10 VIAL (ML) INJECTION PRN
Status: DISCONTINUED | OUTPATIENT
Start: 2025-01-10 | End: 2025-01-10 | Stop reason: HOSPADM

## 2025-01-10 RX ORDER — DEXTROSE MONOHYDRATE 25 G/50ML
25 INJECTION, SOLUTION INTRAVENOUS PRN
Status: DISCONTINUED | OUTPATIENT
Start: 2025-01-10 | End: 2025-01-10 | Stop reason: HOSPADM

## 2025-01-10 RX ORDER — DEXAMETHASONE SODIUM PHOSPHATE 10 MG/ML
4 INJECTION, SOLUTION INTRAMUSCULAR; INTRAVENOUS
Status: DISCONTINUED | OUTPATIENT
Start: 2025-01-10 | End: 2025-01-10 | Stop reason: HOSPADM

## 2025-01-10 RX ORDER — PROPOFOL 10 MG/ML
INJECTION, EMULSION INTRAVENOUS PRN
Status: DISCONTINUED | OUTPATIENT
Start: 2025-01-10 | End: 2025-01-10

## 2025-01-10 RX ORDER — FERROUS SULFATE 325(65) MG
325 TABLET ORAL
Status: DISCONTINUED | OUTPATIENT
Start: 2025-01-11 | End: 2025-01-10 | Stop reason: HOSPADM

## 2025-01-10 RX ORDER — HUMAN INSULIN 100 [IU]/ML
INJECTION, SOLUTION SUBCUTANEOUS
Status: DISCONTINUED | OUTPATIENT
Start: 2025-01-10 | End: 2025-01-10 | Stop reason: HOSPADM

## 2025-01-10 RX ORDER — LIDOCAINE HYDROCHLORIDE 20 MG/ML
INJECTION, SOLUTION INFILTRATION; PERINEURAL PRN
Status: DISCONTINUED | OUTPATIENT
Start: 2025-01-10 | End: 2025-01-10

## 2025-01-10 RX ORDER — ONDANSETRON 2 MG/ML
INJECTION INTRAMUSCULAR; INTRAVENOUS PRN
Status: DISCONTINUED | OUTPATIENT
Start: 2025-01-10 | End: 2025-01-10

## 2025-01-10 RX ORDER — HYDROCODONE BITARTRATE AND ACETAMINOPHEN 5; 325 MG/1; MG/1
1 TABLET ORAL EVERY 4 HOURS PRN
Status: DISCONTINUED | OUTPATIENT
Start: 2025-01-10 | End: 2025-01-10 | Stop reason: HOSPADM

## 2025-01-10 RX ORDER — SODIUM CHLORIDE, SODIUM GLUCONATE, SODIUM ACETATE, POTASSIUM CHLORIDE AND MAGNESIUM CHLORIDE 526; 502; 368; 37; 30 MG/100ML; MG/100ML; MG/100ML; MG/100ML; MG/100ML
INJECTION, SOLUTION INTRAVENOUS CONTINUOUS PRN
Status: DISCONTINUED | OUTPATIENT
Start: 2025-01-10 | End: 2025-01-10

## 2025-01-10 RX ORDER — ONDANSETRON 4 MG/1
4 TABLET, ORALLY DISINTEGRATING ORAL EVERY 12 HOURS PRN
Status: DISCONTINUED | OUTPATIENT
Start: 2025-01-10 | End: 2025-01-10 | Stop reason: HOSPADM

## 2025-01-10 RX ORDER — HALOPERIDOL 5 MG/ML
1 INJECTION INTRAMUSCULAR
Status: DISCONTINUED | OUTPATIENT
Start: 2025-01-10 | End: 2025-01-10 | Stop reason: HOSPADM

## 2025-01-10 RX ORDER — ASPIRIN 81 MG/1
81 TABLET, CHEWABLE ORAL 2 TIMES DAILY
Qty: 60 TABLET | Refills: 0 | Status: SHIPPED | OUTPATIENT
Start: 2025-01-11 | End: 2025-01-10

## 2025-01-10 RX ORDER — TRANEXAMIC ACID 10 MG/ML
1000 INJECTION, SOLUTION INTRAVENOUS
Status: COMPLETED | OUTPATIENT
Start: 2025-01-10 | End: 2025-01-10

## 2025-01-10 RX ORDER — ONDANSETRON 2 MG/ML
4 INJECTION INTRAMUSCULAR; INTRAVENOUS EVERY 12 HOURS PRN
Status: DISCONTINUED | OUTPATIENT
Start: 2025-01-10 | End: 2025-01-10 | Stop reason: HOSPADM

## 2025-01-10 RX ADMIN — HYDROMORPHONE HYDROCHLORIDE 1 MG: 1 INJECTION, SOLUTION INTRAMUSCULAR; INTRAVENOUS; SUBCUTANEOUS at 11:44

## 2025-01-10 RX ADMIN — FENTANYL CITRATE 100 MCG: 50 INJECTION INTRAMUSCULAR; INTRAVENOUS at 13:33

## 2025-01-10 RX ADMIN — OXYCODONE HYDROCHLORIDE 10 MG: 5 TABLET ORAL at 14:21

## 2025-01-10 RX ADMIN — FENTANYL CITRATE 50 MCG: 50 INJECTION INTRAMUSCULAR; INTRAVENOUS at 11:48

## 2025-01-10 RX ADMIN — ONDANSETRON 4 MG: 2 INJECTION INTRAMUSCULAR; INTRAVENOUS at 11:56

## 2025-01-10 RX ADMIN — PROPOFOL 200 MG: 10 INJECTION, EMULSION INTRAVENOUS at 11:48

## 2025-01-10 RX ADMIN — ACETAMINOPHEN 1000 MG: 500 TABLET ORAL at 08:49

## 2025-01-10 RX ADMIN — SUGAMMADEX 50 MG: 100 INJECTION, SOLUTION INTRAVENOUS at 13:10

## 2025-01-10 RX ADMIN — Medication 20 MG: at 11:48

## 2025-01-10 RX ADMIN — SUGAMMADEX 50 MG: 100 INJECTION, SOLUTION INTRAVENOUS at 13:12

## 2025-01-10 RX ADMIN — SUGAMMADEX 50 MG: 100 INJECTION, SOLUTION INTRAVENOUS at 13:13

## 2025-01-10 RX ADMIN — FENTANYL CITRATE 50 MCG: 50 INJECTION INTRAMUSCULAR; INTRAVENOUS at 13:53

## 2025-01-10 RX ADMIN — CELECOXIB 200 MG: 100 CAPSULE ORAL at 08:50

## 2025-01-10 RX ADMIN — FENTANYL CITRATE 50 MCG: 50 INJECTION INTRAMUSCULAR; INTRAVENOUS at 12:05

## 2025-01-10 RX ADMIN — DEXMEDETOMIDINE HYDROCHLORIDE 8 MCG: 4 INJECTION, SOLUTION INTRAVENOUS at 13:21

## 2025-01-10 RX ADMIN — Medication 10 MG: at 12:05

## 2025-01-10 RX ADMIN — SODIUM CHLORIDE, SODIUM GLUCONATE, SODIUM ACETATE, POTASSIUM CHLORIDE AND MAGNESIUM CHLORIDE: 526; 502; 368; 37; 30 INJECTION, SOLUTION INTRAVENOUS at 12:29

## 2025-01-10 RX ADMIN — Medication 20 MG: at 11:56

## 2025-01-10 RX ADMIN — LIDOCAINE HYDROCHLORIDE 70 MG: 20 INJECTION, SOLUTION INFILTRATION; PERINEURAL at 11:48

## 2025-01-10 RX ADMIN — FENTANYL CITRATE 50 MCG: 50 INJECTION INTRAMUSCULAR; INTRAVENOUS at 13:13

## 2025-01-10 RX ADMIN — LIDOCAINE HYDROCHLORIDE 20 MG: 20 INJECTION, SOLUTION INFILTRATION; PERINEURAL at 13:10

## 2025-01-10 RX ADMIN — SODIUM CHLORIDE, SODIUM GLUCONATE, SODIUM ACETATE, POTASSIUM CHLORIDE AND MAGNESIUM CHLORIDE: 526; 502; 368; 37; 30 INJECTION, SOLUTION INTRAVENOUS at 11:44

## 2025-01-10 RX ADMIN — ROCURONIUM BROMIDE 50 MG: 10 INJECTION INTRAVENOUS at 11:48

## 2025-01-10 RX ADMIN — ROCURONIUM BROMIDE 20 MG: 10 INJECTION INTRAVENOUS at 12:25

## 2025-01-10 RX ADMIN — SUGAMMADEX 50 MG: 100 INJECTION, SOLUTION INTRAVENOUS at 13:11

## 2025-01-10 RX ADMIN — DEXMEDETOMIDINE HYDROCHLORIDE 8 MCG: 4 INJECTION, SOLUTION INTRAVENOUS at 13:25

## 2025-01-10 RX ADMIN — FENTANYL CITRATE 50 MCG: 50 INJECTION INTRAMUSCULAR; INTRAVENOUS at 14:00

## 2025-01-10 RX ADMIN — TRANEXAMIC ACID 1000 MG: 10 INJECTION, SOLUTION INTRAVENOUS at 11:55

## 2025-01-10 RX ADMIN — DEXAMETHASONE SODIUM PHOSPHATE 8 MG: 4 INJECTION INTRA-ARTICULAR; INTRALESIONAL; INTRAMUSCULAR; INTRAVENOUS; SOFT TISSUE at 11:56

## 2025-01-10 RX ADMIN — WATER 2000 MG: 1 INJECTION INTRAMUSCULAR; INTRAVENOUS; SUBCUTANEOUS at 11:55

## 2025-01-10 ASSESSMENT — COGNITIVE AND FUNCTIONAL STATUS - GENERAL
HELP NEEDED DRESSING REGULAR LOWER BODY CLOTHING: A LITTLE
HELP NEEDED FOR BATHING: A LITTLE
DAILY_ACTIVITY_CONVERTED_SCORE: 47.10
DAILY_ACTIVITY_RAW_SCORE: 22
BASIC_MOBILITY_RAW_SCORE: 24
BASIC_MOBILITY_CONVERTED_SCORE: 57.68

## 2025-01-10 ASSESSMENT — PAIN SCALES - GENERAL
PAINLEVEL_OUTOF10: 8
PAINLEVEL_OUTOF10: 6
PAINLEVEL_OUTOF10: 7
PAINLEVEL_OUTOF10: 5
PAINLEVEL_OUTOF10: 7
PAINLEVEL_OUTOF10: 6
PAINLEVEL_OUTOF10: 5
PAINLEVEL_OUTOF10: 4
PAINLEVEL_OUTOF10: 8

## 2025-01-10 ASSESSMENT — ENCOUNTER SYMPTOMS: PAIN SEVERITY NOW: 5

## 2025-01-13 ENCOUNTER — HOSPITAL ENCOUNTER (OUTPATIENT)
Dept: REHABILITATION | Age: 70
Discharge: STILL ADMITTED OR ADMINISTRATIVE | End: 2025-01-13
Attending: ORTHOPAEDIC SURGERY

## 2025-01-13 ENCOUNTER — APPOINTMENT (OUTPATIENT)
Dept: REHABILITATION | Age: 70
End: 2025-01-13
Attending: ORTHOPAEDIC SURGERY

## 2025-01-13 VITALS
HEIGHT: 71 IN | OXYGEN SATURATION: 97 % | WEIGHT: 212 LBS | SYSTOLIC BLOOD PRESSURE: 119 MMHG | BODY MASS INDEX: 29.68 KG/M2 | HEART RATE: 82 BPM | RESPIRATION RATE: 16 BRPM | DIASTOLIC BLOOD PRESSURE: 65 MMHG | TEMPERATURE: 97.8 F

## 2025-01-13 PROCEDURE — 10004173 HB COUNTER-THERAPY VISIT PT

## 2025-01-13 PROCEDURE — 97110 THERAPEUTIC EXERCISES: CPT

## 2025-01-13 PROCEDURE — 97161 PT EVAL LOW COMPLEX 20 MIN: CPT

## 2025-01-13 PROCEDURE — 97140 MANUAL THERAPY 1/> REGIONS: CPT

## 2025-01-13 ASSESSMENT — HOOS JR
RISING FROM SITTING: SEVERE
SITTING: SEVERE
WALKING ON UNEVEN SURFACE: MODERATE
GOING UP OR DOWN STAIRS: MODERATE
BENDING TO THE FLOOR TO PICK UP OBJECT: EXTREME
HOOS JR CALCULATED SCORE: 32.74
LYING IN BED (TURNING OVER, MAINTAINING HIP POSITION): EXTREME
HOOS JR RAW SCORE: 18

## 2025-01-13 ASSESSMENT — ENCOUNTER SYMPTOMS
PAIN SCALE AT HIGHEST: 7
PAIN SEVERITY NOW: 2
PAIN SCALE AT LOWEST: 2
QUALITY: ACHE
ALLEVIATING FACTORS: REST
PAIN LOCATION: L HIP
QUALITY: SHARP

## 2025-01-15 ENCOUNTER — HOSPITAL ENCOUNTER (OUTPATIENT)
Dept: REHABILITATION | Age: 70
Discharge: STILL ADMITTED OR ADMINISTRATIVE | End: 2025-01-15
Attending: ORTHOPAEDIC SURGERY

## 2025-01-15 ENCOUNTER — APPOINTMENT (OUTPATIENT)
Dept: REHABILITATION | Age: 70
End: 2025-01-15
Attending: ORTHOPAEDIC SURGERY

## 2025-01-15 PROCEDURE — 97110 THERAPEUTIC EXERCISES: CPT

## 2025-01-15 PROCEDURE — 97140 MANUAL THERAPY 1/> REGIONS: CPT

## 2025-01-15 PROCEDURE — 10004173 HB COUNTER-THERAPY VISIT PT

## 2025-01-20 ENCOUNTER — APPOINTMENT (OUTPATIENT)
Dept: REHABILITATION | Age: 70
End: 2025-01-20
Attending: ORTHOPAEDIC SURGERY

## 2025-01-20 PROCEDURE — 97110 THERAPEUTIC EXERCISES: CPT

## 2025-01-20 PROCEDURE — 10004173 HB COUNTER-THERAPY VISIT PT

## 2025-01-20 PROCEDURE — 97140 MANUAL THERAPY 1/> REGIONS: CPT

## 2025-01-20 ASSESSMENT — ENCOUNTER SYMPTOMS: PAIN SEVERITY NOW: 4

## 2025-01-22 ENCOUNTER — APPOINTMENT (OUTPATIENT)
Dept: REHABILITATION | Age: 70
End: 2025-01-22
Attending: ORTHOPAEDIC SURGERY

## 2025-01-22 PROCEDURE — 97110 THERAPEUTIC EXERCISES: CPT | Performed by: PHYSICAL THERAPY ASSISTANT

## 2025-01-22 PROCEDURE — 10004173 HB COUNTER-THERAPY VISIT PT: Performed by: PHYSICAL THERAPY ASSISTANT

## 2025-01-23 ENCOUNTER — APPOINTMENT (OUTPATIENT)
Dept: REHABILITATION | Age: 70
End: 2025-01-23
Attending: ORTHOPAEDIC SURGERY

## 2025-01-27 ENCOUNTER — APPOINTMENT (OUTPATIENT)
Dept: REHABILITATION | Age: 70
End: 2025-01-27
Attending: ORTHOPAEDIC SURGERY

## 2025-01-27 PROCEDURE — 97110 THERAPEUTIC EXERCISES: CPT

## 2025-01-27 PROCEDURE — 10004173 HB COUNTER-THERAPY VISIT PT

## 2025-01-29 ENCOUNTER — APPOINTMENT (OUTPATIENT)
Dept: REHABILITATION | Age: 70
End: 2025-01-29
Attending: ORTHOPAEDIC SURGERY

## 2025-01-30 ENCOUNTER — APPOINTMENT (OUTPATIENT)
Dept: REHABILITATION | Age: 70
End: 2025-01-30
Attending: ORTHOPAEDIC SURGERY

## 2025-02-03 ENCOUNTER — APPOINTMENT (OUTPATIENT)
Dept: REHABILITATION | Age: 70
End: 2025-02-03
Attending: ORTHOPAEDIC SURGERY

## 2025-02-05 ENCOUNTER — APPOINTMENT (OUTPATIENT)
Dept: REHABILITATION | Age: 70
End: 2025-02-05
Attending: ORTHOPAEDIC SURGERY

## 2025-02-10 ENCOUNTER — APPOINTMENT (OUTPATIENT)
Dept: REHABILITATION | Age: 70
End: 2025-02-10
Attending: ORTHOPAEDIC SURGERY

## 2025-02-12 ENCOUNTER — APPOINTMENT (OUTPATIENT)
Dept: REHABILITATION | Age: 70
End: 2025-02-12
Attending: ORTHOPAEDIC SURGERY

## 2025-02-17 ENCOUNTER — APPOINTMENT (OUTPATIENT)
Dept: REHABILITATION | Age: 70
End: 2025-02-17
Attending: ORTHOPAEDIC SURGERY

## 2025-02-19 ENCOUNTER — LAB SERVICES (OUTPATIENT)
Dept: LAB | Age: 70
End: 2025-02-19

## 2025-02-19 ENCOUNTER — APPOINTMENT (OUTPATIENT)
Dept: INTERNAL MEDICINE | Age: 70
End: 2025-02-19

## 2025-02-19 VITALS
HEART RATE: 85 BPM | SYSTOLIC BLOOD PRESSURE: 132 MMHG | WEIGHT: 223 LBS | DIASTOLIC BLOOD PRESSURE: 80 MMHG | BODY MASS INDEX: 31.22 KG/M2 | HEIGHT: 71 IN | OXYGEN SATURATION: 96 %

## 2025-02-19 DIAGNOSIS — E11.9 TYPE 2 DIABETES MELLITUS WITHOUT COMPLICATION, WITHOUT LONG-TERM CURRENT USE OF INSULIN  (CMD): ICD-10-CM

## 2025-02-19 DIAGNOSIS — Z12.11 COLON CANCER SCREENING: ICD-10-CM

## 2025-02-19 DIAGNOSIS — Z86.0100 HISTORY OF COLON POLYPS: ICD-10-CM

## 2025-02-19 DIAGNOSIS — I15.2 HYPERTENSION ASSOCIATED WITH TYPE 2 DIABETES MELLITUS  (CMD): ICD-10-CM

## 2025-02-19 DIAGNOSIS — E11.59 HYPERTENSION ASSOCIATED WITH TYPE 2 DIABETES MELLITUS  (CMD): ICD-10-CM

## 2025-02-19 DIAGNOSIS — E11.9 TYPE 2 DIABETES MELLITUS WITHOUT COMPLICATION, WITHOUT LONG-TERM CURRENT USE OF INSULIN  (CMD): Primary | ICD-10-CM

## 2025-02-19 PROCEDURE — 36415 COLL VENOUS BLD VENIPUNCTURE: CPT | Performed by: INTERNAL MEDICINE

## 2025-02-19 PROCEDURE — 83036 HEMOGLOBIN GLYCOSYLATED A1C: CPT | Performed by: CLINICAL MEDICAL LABORATORY

## 2025-02-19 PROCEDURE — 80048 BASIC METABOLIC PNL TOTAL CA: CPT | Performed by: CLINICAL MEDICAL LABORATORY

## 2025-02-19 PROCEDURE — 99213 OFFICE O/P EST LOW 20 MIN: CPT

## 2025-02-20 ENCOUNTER — TELEPHONE (OUTPATIENT)
Dept: INTERNAL MEDICINE | Age: 70
End: 2025-02-20

## 2025-02-20 ENCOUNTER — APPOINTMENT (OUTPATIENT)
Dept: REHABILITATION | Age: 70
End: 2025-02-20
Attending: ORTHOPAEDIC SURGERY

## 2025-02-20 LAB
ANION GAP SERPL CALC-SCNC: 12 MMOL/L (ref 7–19)
BUN SERPL-MCNC: 18 MG/DL (ref 6–20)
BUN/CREAT SERPL: 20 (ref 7–25)
CALCIUM SERPL-MCNC: 10.2 MG/DL (ref 8.4–10.2)
CHLORIDE SERPL-SCNC: 104 MMOL/L (ref 97–110)
CO2 SERPL-SCNC: 25 MMOL/L (ref 21–32)
CREAT SERPL-MCNC: 0.89 MG/DL (ref 0.67–1.17)
EGFRCR SERPLBLD CKD-EPI 2021: >90 ML/MIN/{1.73_M2}
FASTING DURATION TIME PATIENT: 4 HOURS (ref 0–999)
GLUCOSE SERPL-MCNC: 124 MG/DL (ref 70–99)
HBA1C MFR BLD: 6.6 % (ref 4.5–5.6)
POTASSIUM SERPL-SCNC: 4.2 MMOL/L (ref 3.4–5.1)
SODIUM SERPL-SCNC: 137 MMOL/L (ref 135–145)

## 2025-02-23 DIAGNOSIS — E11.9 TYPE 2 DIABETES MELLITUS WITHOUT COMPLICATION, WITHOUT LONG-TERM CURRENT USE OF INSULIN  (CMD): ICD-10-CM

## 2025-02-24 ENCOUNTER — APPOINTMENT (OUTPATIENT)
Dept: REHABILITATION | Age: 70
End: 2025-02-24
Attending: ORTHOPAEDIC SURGERY

## 2025-02-24 ENCOUNTER — TELEPHONE (OUTPATIENT)
Dept: INTERNAL MEDICINE | Age: 70
End: 2025-02-24

## 2025-02-24 DIAGNOSIS — E11.9 TYPE 2 DIABETES MELLITUS WITHOUT COMPLICATION, WITHOUT LONG-TERM CURRENT USE OF INSULIN  (CMD): ICD-10-CM

## 2025-02-24 RX ORDER — SEMAGLUTIDE 1.34 MG/ML
INJECTION, SOLUTION SUBCUTANEOUS
Qty: 3 ML | Refills: 0 | Status: SHIPPED | OUTPATIENT
Start: 2025-02-24 | End: 2025-02-24 | Stop reason: SDUPTHER

## 2025-02-24 RX ORDER — SEMAGLUTIDE 1.34 MG/ML
1 INJECTION, SOLUTION SUBCUTANEOUS
Qty: 3 ML | Refills: 0 | Status: SHIPPED | OUTPATIENT
Start: 2025-02-24 | End: 2025-02-26 | Stop reason: SDUPTHER

## 2025-02-26 ENCOUNTER — APPOINTMENT (OUTPATIENT)
Dept: ENDOCRINOLOGY | Age: 70
End: 2025-02-26

## 2025-02-26 VITALS
DIASTOLIC BLOOD PRESSURE: 72 MMHG | BODY MASS INDEX: 31.14 KG/M2 | SYSTOLIC BLOOD PRESSURE: 124 MMHG | HEIGHT: 71 IN | HEART RATE: 82 BPM | OXYGEN SATURATION: 98 % | WEIGHT: 222.4 LBS

## 2025-02-26 DIAGNOSIS — E11.65 TYPE 2 DIABETES MELLITUS WITH HYPERGLYCEMIA, WITHOUT LONG-TERM CURRENT USE OF INSULIN (CMD): ICD-10-CM

## 2025-02-26 DIAGNOSIS — E66.9 TYPE 2 DIABETES MELLITUS WITH OBESITY  (CMD): Primary | ICD-10-CM

## 2025-02-26 DIAGNOSIS — E11.69 TYPE 2 DIABETES MELLITUS WITH OBESITY  (CMD): Primary | ICD-10-CM

## 2025-02-26 PROCEDURE — G2211 COMPLEX E/M VISIT ADD ON: HCPCS | Performed by: INTERNAL MEDICINE

## 2025-02-26 PROCEDURE — 99204 OFFICE O/P NEW MOD 45 MIN: CPT | Performed by: INTERNAL MEDICINE

## 2025-02-26 RX ORDER — SEMAGLUTIDE 1.34 MG/ML
1 INJECTION, SOLUTION SUBCUTANEOUS
Qty: 3 ML | Refills: 5 | Status: SHIPPED | OUTPATIENT
Start: 2025-02-26

## 2025-02-26 RX ORDER — METFORMIN HYDROCHLORIDE 500 MG/1
1000 TABLET, EXTENDED RELEASE ORAL 2 TIMES DAILY WITH MEALS
Qty: 360 TABLET | Refills: 3 | Status: SHIPPED | OUTPATIENT
Start: 2025-02-26

## 2025-05-12 ENCOUNTER — CLINICAL ABSTRACT (OUTPATIENT)
Dept: INTERNAL MEDICINE | Age: 70
End: 2025-05-12

## 2025-05-19 ENCOUNTER — APPOINTMENT (OUTPATIENT)
Dept: INTERNAL MEDICINE | Age: 70
End: 2025-05-19

## 2025-05-27 ENCOUNTER — TELEPHONE (OUTPATIENT)
Dept: INTERNAL MEDICINE | Age: 70
End: 2025-05-27

## 2025-05-27 ENCOUNTER — APPOINTMENT (OUTPATIENT)
Dept: INTERNAL MEDICINE | Age: 70
End: 2025-05-27

## 2025-05-27 VITALS
OXYGEN SATURATION: 96 % | WEIGHT: 215 LBS | DIASTOLIC BLOOD PRESSURE: 78 MMHG | BODY MASS INDEX: 30.1 KG/M2 | HEART RATE: 81 BPM | HEIGHT: 71 IN | SYSTOLIC BLOOD PRESSURE: 110 MMHG

## 2025-05-27 DIAGNOSIS — M62.830 SPASM OF BACK MUSCLES: Primary | ICD-10-CM

## 2025-05-27 DIAGNOSIS — M79.18 MUSCULAR ABDOMINAL PAIN IN LEFT UPPER QUADRANT: Primary | ICD-10-CM

## 2025-05-27 DIAGNOSIS — E11.59 HYPERTENSION ASSOCIATED WITH TYPE 2 DIABETES MELLITUS  (CMD): ICD-10-CM

## 2025-05-27 DIAGNOSIS — I15.2 HYPERTENSION ASSOCIATED WITH TYPE 2 DIABETES MELLITUS  (CMD): ICD-10-CM

## 2025-05-27 PROCEDURE — 99213 OFFICE O/P EST LOW 20 MIN: CPT

## 2025-05-27 RX ORDER — LIDOCAINE 4 G/G
1 PATCH TOPICAL
Qty: 15 PATCH | Refills: 1 | Status: SHIPPED | OUTPATIENT
Start: 2025-05-27

## 2025-05-28 RX ORDER — BACLOFEN 5 MG/1
5 TABLET ORAL 3 TIMES DAILY PRN
Qty: 30 TABLET | Refills: 0 | Status: SHIPPED | OUTPATIENT
Start: 2025-05-28

## 2025-06-02 DIAGNOSIS — E11.9 TYPE 2 DIABETES MELLITUS WITHOUT COMPLICATION, WITHOUT LONG-TERM CURRENT USE OF INSULIN (CMD): ICD-10-CM

## 2025-06-03 RX ORDER — ATORVASTATIN CALCIUM 20 MG/1
20 TABLET, FILM COATED ORAL DAILY
Qty: 90 TABLET | Refills: 2 | OUTPATIENT
Start: 2025-06-03

## 2025-06-16 DIAGNOSIS — E11.9 TYPE 2 DIABETES MELLITUS WITHOUT COMPLICATION, WITHOUT LONG-TERM CURRENT USE OF INSULIN (CMD): ICD-10-CM

## 2025-06-16 RX ORDER — ATORVASTATIN CALCIUM 20 MG/1
20 TABLET, FILM COATED ORAL DAILY
Qty: 90 TABLET | Refills: 2 | OUTPATIENT
Start: 2025-06-16

## 2025-06-30 ENCOUNTER — TELEPHONE (OUTPATIENT)
Dept: INTERNAL MEDICINE | Age: 70
End: 2025-06-30

## 2025-07-01 ENCOUNTER — APPOINTMENT (OUTPATIENT)
Dept: INTERNAL MEDICINE | Age: 70
End: 2025-07-01

## 2025-07-01 ENCOUNTER — LAB SERVICES (OUTPATIENT)
Dept: LAB | Age: 70
End: 2025-07-01

## 2025-07-01 VITALS
WEIGHT: 210 LBS | BODY MASS INDEX: 29.4 KG/M2 | SYSTOLIC BLOOD PRESSURE: 128 MMHG | HEIGHT: 71 IN | HEART RATE: 103 BPM | DIASTOLIC BLOOD PRESSURE: 78 MMHG | OXYGEN SATURATION: 95 %

## 2025-07-01 DIAGNOSIS — R10.9 LEFT SIDED ABDOMINAL PAIN: ICD-10-CM

## 2025-07-01 DIAGNOSIS — M25.522 LEFT ELBOW PAIN: Primary | ICD-10-CM

## 2025-07-01 LAB
APPEARANCE UR: CLEAR
BILIRUB UR QL STRIP: NEGATIVE
COLOR UR: YELLOW
GLUCOSE UR STRIP-MCNC: NEGATIVE MG/DL
HGB UR QL STRIP: NEGATIVE
KETONES UR STRIP-MCNC: NEGATIVE MG/DL
LEUKOCYTE ESTERASE UR QL STRIP: NEGATIVE
NITRITE UR QL STRIP: NEGATIVE
PH UR STRIP: 6 [PH] (ref 5–7)
PROT UR STRIP-MCNC: NEGATIVE MG/DL
SP GR UR STRIP: 1.02 (ref 1–1.03)
UROBILINOGEN UR STRIP-MCNC: 0.2 MG/DL

## 2025-07-01 PROCEDURE — 36415 COLL VENOUS BLD VENIPUNCTURE: CPT | Performed by: INTERNAL MEDICINE

## 2025-07-01 PROCEDURE — 81003 URINALYSIS AUTO W/O SCOPE: CPT | Performed by: CLINICAL MEDICAL LABORATORY

## 2025-07-01 PROCEDURE — 85025 COMPLETE CBC W/AUTO DIFF WBC: CPT | Performed by: CLINICAL MEDICAL LABORATORY

## 2025-07-01 PROCEDURE — 80053 COMPREHEN METABOLIC PANEL: CPT | Performed by: CLINICAL MEDICAL LABORATORY

## 2025-07-01 PROCEDURE — 99213 OFFICE O/P EST LOW 20 MIN: CPT

## 2025-07-02 ENCOUNTER — RESULTS FOLLOW-UP (OUTPATIENT)
Dept: INTERNAL MEDICINE | Age: 70
End: 2025-07-02

## 2025-07-02 DIAGNOSIS — R10.9 LEFT SIDED ABDOMINAL PAIN: Primary | ICD-10-CM

## 2025-07-02 DIAGNOSIS — D72.829 LEUKOCYTOSIS, UNSPECIFIED TYPE: ICD-10-CM

## 2025-07-02 LAB
ALBUMIN SERPL-MCNC: 4.3 G/DL (ref 3.4–5)
ALBUMIN/GLOB SERPL: 1.3 {RATIO} (ref 1–2.4)
ALP SERPL-CCNC: 109 UNITS/L (ref 45–117)
ALT SERPL-CCNC: 23 UNITS/L
ANION GAP SERPL CALC-SCNC: 10 MMOL/L (ref 7–19)
AST SERPL-CCNC: 14 UNITS/L
BASOPHILS # BLD: 0.1 K/MCL (ref 0–0.3)
BASOPHILS NFR BLD: 1 %
BILIRUB SERPL-MCNC: 0.6 MG/DL (ref 0.2–1)
BUN SERPL-MCNC: 18 MG/DL (ref 6–20)
BUN/CREAT SERPL: 19 (ref 7–25)
CALCIUM SERPL-MCNC: 9.8 MG/DL (ref 8.4–10.2)
CHLORIDE SERPL-SCNC: 107 MMOL/L (ref 97–110)
CO2 SERPL-SCNC: 25 MMOL/L (ref 21–32)
CREAT SERPL-MCNC: 0.94 MG/DL (ref 0.67–1.17)
DEPRECATED RDW RBC: 42 FL (ref 39–50)
EGFRCR SERPLBLD CKD-EPI 2021: 88 ML/MIN/{1.73_M2}
EOSINOPHIL # BLD: 0.3 K/MCL (ref 0–0.5)
EOSINOPHIL NFR BLD: 2 %
ERYTHROCYTE [DISTWIDTH] IN BLOOD: 13.1 % (ref 11–15)
FASTING DURATION TIME PATIENT: 12 HOURS (ref 0–999)
GLOBULIN SER-MCNC: 3.4 G/DL (ref 2–4)
GLUCOSE SERPL-MCNC: 120 MG/DL (ref 70–99)
HCT VFR BLD CALC: 47.3 % (ref 39–51)
HGB BLD-MCNC: 15 G/DL (ref 13–17)
IMM GRANULOCYTES # BLD AUTO: 0 K/MCL (ref 0–0.2)
IMM GRANULOCYTES # BLD: 0 %
LYMPHOCYTES # BLD: 2.3 K/MCL (ref 1–4)
LYMPHOCYTES NFR BLD: 20 %
MCH RBC QN AUTO: 27.8 PG (ref 26–34)
MCHC RBC AUTO-ENTMCNC: 31.7 G/DL (ref 32–36.5)
MCV RBC AUTO: 87.6 FL (ref 78–100)
MONOCYTES # BLD: 1.2 K/MCL (ref 0.3–0.9)
MONOCYTES NFR BLD: 10 %
NEUTROPHILS # BLD: 7.8 K/MCL (ref 1.8–7.7)
NEUTROPHILS NFR BLD: 67 %
NRBC BLD MANUAL-RTO: 0 /100 WBC
PLATELET # BLD AUTO: 278 K/MCL (ref 140–450)
POTASSIUM SERPL-SCNC: 4.4 MMOL/L (ref 3.4–5.1)
PROT SERPL-MCNC: 7.7 G/DL (ref 6.4–8.2)
RBC # BLD: 5.4 MIL/MCL (ref 4.5–5.9)
SODIUM SERPL-SCNC: 138 MMOL/L (ref 135–145)
WBC # BLD: 11.6 K/MCL (ref 4.2–11)

## 2025-07-10 ENCOUNTER — HOSPITAL ENCOUNTER (OUTPATIENT)
Dept: CT IMAGING | Age: 70
Discharge: HOME | End: 2025-07-10

## 2025-07-10 DIAGNOSIS — D72.829 LEUKOCYTOSIS, UNSPECIFIED TYPE: ICD-10-CM

## 2025-07-10 DIAGNOSIS — R10.9 LEFT SIDED ABDOMINAL PAIN: ICD-10-CM

## 2025-07-10 PROCEDURE — 74176 CT ABD & PELVIS W/O CONTRAST: CPT

## 2025-07-11 ENCOUNTER — RESULTS FOLLOW-UP (OUTPATIENT)
Dept: INTERNAL MEDICINE | Age: 70
End: 2025-07-11

## 2025-07-21 ENCOUNTER — TELEPHONE (OUTPATIENT)
Dept: INTERNAL MEDICINE | Age: 70
End: 2025-07-21

## 2025-07-21 DIAGNOSIS — M25.512 ACUTE PAIN OF LEFT SHOULDER: ICD-10-CM

## 2025-07-21 DIAGNOSIS — M25.522 LEFT ELBOW PAIN: Primary | ICD-10-CM

## 2025-07-21 DIAGNOSIS — M79.602 LEFT ARM PAIN: ICD-10-CM

## 2025-07-22 ENCOUNTER — HOSPITAL ENCOUNTER (OUTPATIENT)
Dept: GENERAL RADIOLOGY | Age: 70
Discharge: HOME | End: 2025-07-22

## 2025-07-22 DIAGNOSIS — M25.522 LEFT ELBOW PAIN: ICD-10-CM

## 2025-07-22 DIAGNOSIS — M25.512 ACUTE PAIN OF LEFT SHOULDER: ICD-10-CM

## 2025-07-22 DIAGNOSIS — M25.522 LEFT ELBOW PAIN: Primary | ICD-10-CM

## 2025-07-22 PROCEDURE — 73030 X-RAY EXAM OF SHOULDER: CPT

## 2025-07-22 PROCEDURE — 73080 X-RAY EXAM OF ELBOW: CPT

## 2025-07-23 ENCOUNTER — OFFICE VISIT (OUTPATIENT)
Age: 70
End: 2025-07-23

## 2025-07-23 VITALS
WEIGHT: 213 LBS | BODY MASS INDEX: 29.82 KG/M2 | DIASTOLIC BLOOD PRESSURE: 70 MMHG | SYSTOLIC BLOOD PRESSURE: 136 MMHG | HEIGHT: 71 IN

## 2025-07-23 DIAGNOSIS — G89.29 CHRONIC LEFT SHOULDER PAIN: Primary | ICD-10-CM

## 2025-07-23 DIAGNOSIS — M25.512 CHRONIC LEFT SHOULDER PAIN: Primary | ICD-10-CM

## 2025-07-23 PROCEDURE — 99214 OFFICE O/P EST MOD 30 MIN: CPT | Performed by: ORTHOPAEDIC SURGERY

## 2025-07-23 ASSESSMENT — PAIN SCALES - GENERAL: PAINLEVEL_OUTOF10: 6

## 2025-07-29 ENCOUNTER — RESULTS FOLLOW-UP (OUTPATIENT)
Dept: INTERNAL MEDICINE | Age: 70
End: 2025-07-29

## 2025-07-29 DIAGNOSIS — M25.512 ACUTE PAIN OF LEFT SHOULDER: Primary | ICD-10-CM

## 2025-07-29 DIAGNOSIS — M19.022 ARTHRITIS OF LEFT ELBOW: ICD-10-CM

## 2025-07-29 DIAGNOSIS — M19.012 ARTHRITIS OF LEFT SHOULDER REGION: ICD-10-CM

## 2025-07-29 DIAGNOSIS — M25.522 LEFT ELBOW PAIN: ICD-10-CM

## 2025-08-01 ENCOUNTER — APPOINTMENT (OUTPATIENT)
Dept: INTERNAL MEDICINE | Age: 70
End: 2025-08-01

## 2025-08-20 ENCOUNTER — APPOINTMENT (OUTPATIENT)
Dept: LAB | Age: 70
End: 2025-08-20

## 2025-08-26 ENCOUNTER — APPOINTMENT (OUTPATIENT)
Dept: ENDOCRINOLOGY | Age: 70
End: 2025-08-26

## 2025-08-26 DIAGNOSIS — E11.69 TYPE 2 DIABETES MELLITUS WITH OBESITY  (CMD): Primary | ICD-10-CM

## 2025-08-26 DIAGNOSIS — E66.9 TYPE 2 DIABETES MELLITUS WITH OBESITY  (CMD): Primary | ICD-10-CM

## 2025-11-17 ENCOUNTER — APPOINTMENT (OUTPATIENT)
Dept: LAB | Age: 70
End: 2025-11-17

## 2025-11-21 ENCOUNTER — APPOINTMENT (OUTPATIENT)
Dept: INTERNAL MEDICINE | Age: 70
End: 2025-11-21

## (undated) DEVICE — POSITIONER HEAD FOAM PSTN DONUT OD9 IN

## (undated) DEVICE — ADHESIVE SKIN CLSR DERMABOND ADVANCED .7 ML LIQUID APL

## (undated) DEVICE — GLOVE SURG 6.5 PROTEXIS PI PWDR FREE BEAD CUFF PLISPRN L11.3

## (undated) DEVICE — DRAPE SURG 2 INCISE FILM POUCH ISOLATION L129 IN X W100 IN

## (undated) DEVICE — NEEDLE SPNL L3 12 IN REG WALL QUINCKE TIP OD20 GA BD PP YLW

## (undated) DEVICE — FLUID TRAP FOR NEPTUNE

## (undated) DEVICE — GLOVE SURG 7.5 PROTEXIS LF CRM PF SMTH BEAD CUFF STRL

## (undated) DEVICE — DRAPE SURG IMPRV REINFORCEMENT FENESTRATE ABS ARMBRD CVR

## (undated) DEVICE — Device

## (undated) DEVICE — 2% CHLORHEXIDINE SKIN PREP ORANGE 26ML

## (undated) DEVICE — NEEDLE HPO L1 12 IN OD18 GA THIN WALL ECLIPSE PP REG BVL LL

## (undated) DEVICE — SPONGE LAP 18X18IN PREWASH WHT

## (undated) DEVICE — CONSTRUCT HIP TOTAL CONVENT - JOHNSON

## (undated) DEVICE — GLOVE SURG 7 PROTEXIS PI CLASSIC PWDR FREE BEAD CUFF PLISPRN

## (undated) DEVICE — DECANTER FLUID DSPNSR BAG BAG-A-JET ASEPTIC TRNSF

## (undated) DEVICE — SYRINGE 1ML GRAD STRL MED LF DISP LL

## (undated) DEVICE — SYSTEM THERAPEUTIC COLD PLR DRSG PLR CARE CUBE

## (undated) DEVICE — DISSECTOR SURG 40D 20.6MM 19.8MM 4MM 21CM 21.6X2MM LGSR

## (undated) DEVICE — GOWN SURG 2XL STD LGTH PERFORMANCE FABRIC AERO CHROME WHT

## (undated) DEVICE — DEVICE CLSR L18 IN 3-0 P-12 V-LOC 180 ABS CLR

## (undated) DEVICE — SYRINGE 30 ML CONC TIP GRAD NONPYROGENIC DEHP FREE LOK MED

## (undated) DEVICE — GLOVE SURG 8 PROTEXIS PI PWDR FREE SMTH BEAD CUFF INTLK

## (undated) DEVICE — GLOVE SURG 7 PROTEXIS LF BLUE PF SMTH BEAD CUFF INTLK STRL

## (undated) DEVICE — DRAPE SURG U STRIP IMPRV ADH SPLIT L72 IN X W60 IN TIBURON

## (undated) DEVICE — DRAPE C ARM AMD XREF 125X41IN XLONG EQUIPMENT LF

## (undated) DEVICE — DRAPE SURG ADH STRIP SM TOWEL MATTE FNSH L17 IN X W11 IN

## (undated) DEVICE — WATER STRL PLASTIC POUR BTL 1000 ML

## (undated) DEVICE — GOWN SURG XL L3 NONREINFORCE SET IN SLV STRL LF DISP BLUE

## (undated) DEVICE — GLOVE SURG 8 PROTEXIS LF CRM PF BEAD CUFF STRL PLISPRN 12IN

## (undated) DEVICE — DRAPE INCS ANTIMICROBIAL 23X23IN TRANS IOBN2

## (undated) DEVICE — PENCIL SMOKE EVAC COAT PUSH BTN NEPTUNE E-SEP

## (undated) DEVICE — PADDING CAST 4YDX6IN SFRL ABS NS LF

## (undated) DEVICE — DRESSING FOAM 10X4IN POSTOP AGRS ANTIMICROBIAL FLXB MPLX BR

## (undated) DEVICE — HOOD SURGEON PEEL AWAY FACE SHLD T7PLUS

## (undated) DEVICE — DRAPE SURG ADH L51 IN X W47 IN STERI-DRAPE CLR

## (undated) DEVICE — TIP SCT ARG YNKR OD18 FR L25 CM VINYL SMTH INNER LUM FLXB

## (undated) DEVICE — COVER TBL L110 IN X W80 IN REINFORCE BACK HVY DUTY CNVRT

## (undated) DEVICE — WRAP CMPR 5YDX6IN COBAN POR SLFADH ELASTIC LTWT HAND TEAR LF

## (undated) DEVICE — DEVICE CLSR L18 IN 0 GS-25 12 CRC V-LOC 180 TPR PT ABS L48

## (undated) DEVICE — SOLUTION PREP 16OZ 3% H2O2 LF

## (undated) DEVICE — ELECTRODE PT RTN L10 FT MEGADYNE ADULT 2 PLATE CORD

## (undated) DEVICE — BLADE SAW 2.9X.98IN THK.035IN BVL HVDTY ORIENT TEETH RDC

## (undated) DEVICE — SUTURE COAT VICRYL PLUS 2-0 CT-1 L18 IN CNTRL RELS BRAID 8

## (undated) DEVICE — SOLUTION IRR 0.9% NA CL 1000 ML PLASTIC POUR BTL ISOTONIC

## (undated) DEVICE — SYSTEM WND IRR ANTIMICROBIAL BD SURGIPHOR

## (undated) DEVICE — PINNACLE CANCELLOUS BONE SCREW 6.5MM X 20MM
Type: IMPLANTABLE DEVICE | Site: HIP | Status: NON-FUNCTIONAL
Brand: PINNACLE
Removed: 2025-01-10

## (undated) DEVICE — STRIP SKIN CLSR L4 IN X W12 IN REINFORCE STERI-STRIP POLY

## (undated) DEVICE — PAD HIP WRAP ON PLR THERAPEUTIC

## (undated) DEVICE — GLOVE SURG 6 PROTEXIS PI PWDR FREE BEAD CUFF PLISPRN L11.3

## (undated) DEVICE — COVER STAND W23 IN REINFORCE PLASTIC

## (undated) DEVICE — SUTURE ETHIBOND EXCEL GRN 2 V-37 L30 IN BRAID 4 STRN NABSB